# Patient Record
Sex: MALE | Race: WHITE | NOT HISPANIC OR LATINO | Employment: UNEMPLOYED | URBAN - METROPOLITAN AREA
[De-identification: names, ages, dates, MRNs, and addresses within clinical notes are randomized per-mention and may not be internally consistent; named-entity substitution may affect disease eponyms.]

---

## 2017-11-08 LAB
INFLUENZA A ANTIGEN, POC: POSITIVE
INFLUENZA B ANTIGEN, POC: NEGATIVE
RAPID GROUP A STREP (OHS): NEGATIVE

## 2018-05-18 ENCOUNTER — HISTORICAL (OUTPATIENT)
Dept: INTERNAL MEDICINE | Facility: CLINIC | Age: 38
End: 2018-05-18

## 2018-05-18 LAB
ABS NEUT (OLG): 5.41 X10(3)/MCL (ref 2.1–9.2)
ALBUMIN SERPL-MCNC: 3.6 GM/DL (ref 3.4–5)
ALBUMIN/GLOB SERPL: 1 RATIO (ref 1–2)
ALP SERPL-CCNC: 85 UNIT/L (ref 45–117)
ALT SERPL-CCNC: 23 UNIT/L (ref 12–78)
AST SERPL-CCNC: 16 UNIT/L (ref 15–37)
BASOPHILS # BLD AUTO: 0.07 X10(3)/MCL
BASOPHILS NFR BLD AUTO: 1 %
BILIRUB SERPL-MCNC: 0.2 MG/DL (ref 0.2–1)
BILIRUBIN DIRECT+TOT PNL SERPL-MCNC: <0.1 MG/DL
BILIRUBIN DIRECT+TOT PNL SERPL-MCNC: >0.1 MG/DL
BUN SERPL-MCNC: 15 MG/DL (ref 7–18)
CALCIUM SERPL-MCNC: 9.5 MG/DL (ref 8.5–10.1)
CHLORIDE SERPL-SCNC: 104 MMOL/L (ref 98–107)
CHOLEST SERPL-MCNC: 171 MG/DL
CHOLEST/HDLC SERPL: 4.6 {RATIO} (ref 0–5)
CO2 SERPL-SCNC: 32 MMOL/L (ref 21–32)
CREAT SERPL-MCNC: 1.1 MG/DL (ref 0.6–1.3)
EOSINOPHIL # BLD AUTO: 0.31 X10(3)/MCL
EOSINOPHIL NFR BLD AUTO: 3 %
ERYTHROCYTE [DISTWIDTH] IN BLOOD BY AUTOMATED COUNT: 13.1 % (ref 11.5–14.5)
GLOBULIN SER-MCNC: 4.7 GM/ML (ref 2.3–3.5)
GLUCOSE SERPL-MCNC: 95 MG/DL (ref 74–106)
HCT VFR BLD AUTO: 40.8 % (ref 40–51)
HDLC SERPL-MCNC: 37 MG/DL
HGB BLD-MCNC: 12.8 GM/DL (ref 13.5–17.5)
IMM GRANULOCYTES # BLD AUTO: 0.03 10*3/UL
IMM GRANULOCYTES NFR BLD AUTO: 0 %
LDLC SERPL CALC-MCNC: 100 MG/DL (ref 0–130)
LYMPHOCYTES # BLD AUTO: 3.2 X10(3)/MCL
LYMPHOCYTES NFR BLD AUTO: 33 % (ref 13–40)
MCH RBC QN AUTO: 27.2 PG (ref 26–34)
MCHC RBC AUTO-ENTMCNC: 31.4 GM/DL (ref 31–37)
MCV RBC AUTO: 86.6 FL (ref 80–100)
MONOCYTES # BLD AUTO: 0.79 X10(3)/MCL
MONOCYTES NFR BLD AUTO: 8 % (ref 4–12)
NEUTROPHILS # BLD AUTO: 5.41 X10(3)/MCL
NEUTROPHILS NFR BLD AUTO: 55 X10(3)/MCL
PLATELET # BLD AUTO: 321 X10(3)/MCL (ref 130–400)
PMV BLD AUTO: 10.7 FL (ref 7.4–10.4)
POTASSIUM SERPL-SCNC: 4.2 MMOL/L (ref 3.5–5.1)
PROT SERPL-MCNC: 8.3 GM/DL (ref 6.4–8.2)
RBC # BLD AUTO: 4.71 X10(6)/MCL (ref 4.5–5.9)
SODIUM SERPL-SCNC: 138 MMOL/L (ref 136–145)
TRIGL SERPL-MCNC: 172 MG/DL
VLDLC SERPL CALC-MCNC: 34 MG/DL
WBC # SPEC AUTO: 9.8 X10(3)/MCL (ref 4.5–11)

## 2018-07-18 ENCOUNTER — HISTORICAL (OUTPATIENT)
Dept: ENDOSCOPY | Facility: HOSPITAL | Age: 38
End: 2018-07-18

## 2018-10-18 ENCOUNTER — HOSPITAL ENCOUNTER (OUTPATIENT)
Dept: MEDSURG UNIT | Facility: HOSPITAL | Age: 38
End: 2018-10-20
Attending: INTERNAL MEDICINE | Admitting: INTERNAL MEDICINE

## 2018-10-18 LAB
ABS NEUT (OLG): 10.16 X10(3)/MCL (ref 2.1–9.2)
ALBUMIN SERPL-MCNC: 4.1 GM/DL (ref 3.4–5)
ALBUMIN/GLOB SERPL: 1 RATIO (ref 1–2)
ALP SERPL-CCNC: 82 UNIT/L (ref 45–117)
ALT SERPL-CCNC: 30 UNIT/L (ref 12–78)
AMPHET UR QL SCN: NEGATIVE
APPEARANCE, UA: CLEAR
AST SERPL-CCNC: 17 UNIT/L (ref 15–37)
BACTERIA #/AREA URNS AUTO: ABNORMAL /[HPF]
BARBITURATE SCN PRESENT UR: NEGATIVE
BASOPHILS # BLD AUTO: 0.04 X10(3)/MCL
BASOPHILS NFR BLD AUTO: 0 %
BENZODIAZ UR QL SCN: NEGATIVE
BILIRUB SERPL-MCNC: 0.4 MG/DL (ref 0.2–1)
BILIRUB UR QL STRIP: NEGATIVE
BILIRUBIN DIRECT+TOT PNL SERPL-MCNC: 0.1 MG/DL
BILIRUBIN DIRECT+TOT PNL SERPL-MCNC: 0.3 MG/DL
BUN SERPL-MCNC: 49 MG/DL (ref 7–18)
CALCIUM SERPL-MCNC: 9.1 MG/DL (ref 8.5–10.1)
CANNABINOIDS UR QL SCN: POSITIVE
CHLORIDE SERPL-SCNC: 86 MMOL/L (ref 98–107)
CO2 SERPL-SCNC: 36 MMOL/L (ref 21–32)
COCAINE UR QL SCN: NEGATIVE
COLOR UR: ABNORMAL
CREAT SERPL-MCNC: 3.3 MG/DL (ref 0.6–1.3)
EOSINOPHIL # BLD AUTO: 0.06 X10(3)/MCL
EOSINOPHIL NFR BLD AUTO: 0 %
ERYTHROCYTE [DISTWIDTH] IN BLOOD BY AUTOMATED COUNT: 13.6 % (ref 11.5–14.5)
GLOBULIN SER-MCNC: 4.6 GM/ML (ref 2.3–3.5)
GLUCOSE (UA): NORMAL
GLUCOSE SERPL-MCNC: 113 MG/DL (ref 74–106)
HCT VFR BLD AUTO: 43.3 % (ref 40–51)
HGB BLD-MCNC: 14.4 GM/DL (ref 13.5–17.5)
HGB UR QL STRIP: NEGATIVE
HYALINE CASTS #/AREA URNS LPF: ABNORMAL /[LPF]
IMM GRANULOCYTES # BLD AUTO: 0.05 10*3/UL
IMM GRANULOCYTES NFR BLD AUTO: 0 %
KETONES UR QL STRIP: NEGATIVE
LEUKOCYTE ESTERASE UR QL STRIP: NEGATIVE
LYMPHOCYTES # BLD AUTO: 3.66 X10(3)/MCL
LYMPHOCYTES NFR BLD AUTO: 24 % (ref 13–40)
MAGNESIUM SERPL-MCNC: 2.4 MG/DL (ref 1.8–2.4)
MAGNESIUM SERPL-MCNC: 3 MG/DL (ref 1.8–2.4)
MCH RBC QN AUTO: 27.4 PG (ref 26–34)
MCHC RBC AUTO-ENTMCNC: 33.3 GM/DL (ref 31–37)
MCV RBC AUTO: 82.3 FL (ref 80–100)
MONOCYTES # BLD AUTO: 1.5 X10(3)/MCL
MONOCYTES NFR BLD AUTO: 10 % (ref 4–12)
MUCOUS THREADS URNS QL MICRO: ABNORMAL
NEUTROPHILS # BLD AUTO: 10.16 X10(3)/MCL
NEUTROPHILS NFR BLD AUTO: 66 X10(3)/MCL
NITRITE UR QL STRIP: NEGATIVE
OPIATES UR QL SCN: NEGATIVE
PCP UR QL: NEGATIVE
PH UR STRIP.AUTO: 6 [PH] (ref 5–8)
PH UR STRIP: 6 [PH] (ref 4.5–8)
PLATELET # BLD AUTO: 453 X10(3)/MCL (ref 130–400)
PMV BLD AUTO: 10.8 FL (ref 7.4–10.4)
POTASSIUM SERPL-SCNC: 3 MMOL/L (ref 3.5–5.1)
PROT SERPL-MCNC: 8.7 GM/DL (ref 6.4–8.2)
PROT UR QL STRIP: 10 MG/DL
RBC # BLD AUTO: 5.26 X10(6)/MCL (ref 4.5–5.9)
RBC #/AREA URNS AUTO: ABNORMAL /[HPF]
SODIUM SERPL-SCNC: 130 MMOL/L (ref 136–145)
SP GR UR STRIP: 1.01 (ref 1–1.03)
SQUAMOUS #/AREA URNS LPF: ABNORMAL /[LPF]
TEMPERATURE, URINE (OHS): 25 DEGC (ref 20–25)
UROBILINOGEN UR STRIP-ACNC: NORMAL
WBC # SPEC AUTO: 15.5 X10(3)/MCL (ref 4.5–11)
WBC #/AREA URNS AUTO: ABNORMAL /HPF

## 2018-10-19 LAB
ABS NEUT (OLG): 7.87 X10(3)/MCL (ref 2.1–9.2)
ALBUMIN SERPL-MCNC: 3.1 GM/DL (ref 3.4–5)
ALBUMIN/GLOB SERPL: 1 RATIO (ref 1–2)
ALP SERPL-CCNC: 62 UNIT/L (ref 45–117)
ALT SERPL-CCNC: 23 UNIT/L (ref 12–78)
AST SERPL-CCNC: 13 UNIT/L (ref 15–37)
BASOPHILS # BLD AUTO: 0.02 X10(3)/MCL
BASOPHILS NFR BLD AUTO: 0 %
BILIRUB SERPL-MCNC: 0.4 MG/DL (ref 0.2–1)
BILIRUBIN DIRECT+TOT PNL SERPL-MCNC: 0.1 MG/DL
BILIRUBIN DIRECT+TOT PNL SERPL-MCNC: 0.3 MG/DL
BUN SERPL-MCNC: 36 MG/DL (ref 7–18)
CALCIUM SERPL-MCNC: 8.4 MG/DL (ref 8.5–10.1)
CHLORIDE SERPL-SCNC: 97 MMOL/L (ref 98–107)
CO2 SERPL-SCNC: 34 MMOL/L (ref 21–32)
COLOR STL: NORMAL
CONSISTENCY STL: NORMAL
CREAT SERPL-MCNC: 1.9 MG/DL (ref 0.6–1.3)
EOSINOPHIL # BLD AUTO: 0.05 X10(3)/MCL
EOSINOPHIL NFR BLD AUTO: 0 %
ERYTHROCYTE [DISTWIDTH] IN BLOOD BY AUTOMATED COUNT: 13.8 % (ref 11.5–14.5)
GLOBULIN SER-MCNC: 3.6 GM/ML (ref 2.3–3.5)
GLUCOSE SERPL-MCNC: 86 MG/DL (ref 74–106)
HCT VFR BLD AUTO: 36.7 % (ref 40–51)
HEMOCCULT SP1 STL QL: NEGATIVE
HGB BLD-MCNC: 11.9 GM/DL (ref 13.5–17.5)
IMM GRANULOCYTES # BLD AUTO: 0.04 10*3/UL
IMM GRANULOCYTES NFR BLD AUTO: 0 %
LYMPHOCYTES # BLD AUTO: 4.29 X10(3)/MCL
LYMPHOCYTES NFR BLD AUTO: 32 % (ref 13–40)
MCH RBC QN AUTO: 27.2 PG (ref 26–34)
MCHC RBC AUTO-ENTMCNC: 32.4 GM/DL (ref 31–37)
MCV RBC AUTO: 84 FL (ref 80–100)
MONOCYTES # BLD AUTO: 1.03 X10(3)/MCL
MONOCYTES NFR BLD AUTO: 8 % (ref 4–12)
NEUTROPHILS # BLD AUTO: 7.87 X10(3)/MCL
NEUTROPHILS NFR BLD AUTO: 59 X10(3)/MCL
PLATELET # BLD AUTO: 264 X10(3)/MCL (ref 130–400)
PMV BLD AUTO: 11.1 FL (ref 7.4–10.4)
POTASSIUM SERPL-SCNC: 3.8 MMOL/L (ref 3.5–5.1)
PROT SERPL-MCNC: 6.7 GM/DL (ref 6.4–8.2)
RBC # BLD AUTO: 4.37 X10(6)/MCL (ref 4.5–5.9)
SODIUM SERPL-SCNC: 139 MMOL/L (ref 136–145)
WBC # SPEC AUTO: 13.3 X10(3)/MCL (ref 4.5–11)

## 2018-10-20 LAB
ABS NEUT (OLG): 5.23 X10(3)/MCL (ref 2.1–9.2)
ALBUMIN SERPL-MCNC: 2.9 GM/DL (ref 3.4–5)
ALBUMIN/GLOB SERPL: 1 RATIO (ref 1–2)
ALP SERPL-CCNC: 56 UNIT/L (ref 45–117)
ALT SERPL-CCNC: 23 UNIT/L (ref 12–78)
AST SERPL-CCNC: 13 UNIT/L (ref 15–37)
BASOPHILS # BLD AUTO: 0.04 X10(3)/MCL
BASOPHILS NFR BLD AUTO: 0 %
BILIRUB SERPL-MCNC: 0.3 MG/DL (ref 0.2–1)
BILIRUBIN DIRECT+TOT PNL SERPL-MCNC: <0.1 MG/DL
BILIRUBIN DIRECT+TOT PNL SERPL-MCNC: ABNORMAL MG/DL
BUN SERPL-MCNC: 19 MG/DL (ref 7–18)
CALCIUM SERPL-MCNC: 8 MG/DL (ref 8.5–10.1)
CHLORIDE SERPL-SCNC: 105 MMOL/L (ref 98–107)
CO2 SERPL-SCNC: 32 MMOL/L (ref 21–32)
CREAT SERPL-MCNC: 1.1 MG/DL (ref 0.6–1.3)
EOSINOPHIL # BLD AUTO: 0.09 X10(3)/MCL
EOSINOPHIL NFR BLD AUTO: 1 %
ERYTHROCYTE [DISTWIDTH] IN BLOOD BY AUTOMATED COUNT: 13.6 % (ref 11.5–14.5)
GLOBULIN SER-MCNC: 3.4 GM/ML (ref 2.3–3.5)
GLUCOSE SERPL-MCNC: 80 MG/DL (ref 74–106)
HCT VFR BLD AUTO: 34.3 % (ref 40–51)
HGB BLD-MCNC: 11.1 GM/DL (ref 13.5–17.5)
IMM GRANULOCYTES # BLD AUTO: 0.03 10*3/UL
IMM GRANULOCYTES NFR BLD AUTO: 0 %
LYMPHOCYTES # BLD AUTO: 3.36 X10(3)/MCL
LYMPHOCYTES NFR BLD AUTO: 36 % (ref 13–40)
MCH RBC QN AUTO: 27.5 PG (ref 26–34)
MCHC RBC AUTO-ENTMCNC: 32.4 GM/DL (ref 31–37)
MCV RBC AUTO: 84.9 FL (ref 80–100)
MONOCYTES # BLD AUTO: 0.65 X10(3)/MCL
MONOCYTES NFR BLD AUTO: 7 % (ref 4–12)
NEUTROPHILS # BLD AUTO: 5.23 X10(3)/MCL
NEUTROPHILS NFR BLD AUTO: 56 X10(3)/MCL
PLATELET # BLD AUTO: 251 X10(3)/MCL (ref 130–400)
PMV BLD AUTO: 10.9 FL (ref 7.4–10.4)
POTASSIUM SERPL-SCNC: 4.1 MMOL/L (ref 3.5–5.1)
PROT SERPL-MCNC: 6.3 GM/DL (ref 6.4–8.2)
RBC # BLD AUTO: 4.04 X10(6)/MCL (ref 4.5–5.9)
SODIUM SERPL-SCNC: 143 MMOL/L (ref 136–145)
WBC # SPEC AUTO: 9.4 X10(3)/MCL (ref 4.5–11)

## 2018-10-21 LAB — FINAL CULTURE: NORMAL

## 2019-05-30 ENCOUNTER — HISTORICAL (OUTPATIENT)
Dept: INTERNAL MEDICINE | Facility: CLINIC | Age: 39
End: 2019-05-30

## 2019-05-30 LAB
ABS NEUT (OLG): 8.34 X10(3)/MCL (ref 2.1–9.2)
BASOPHILS # BLD AUTO: 0.06 X10(3)/MCL
BASOPHILS NFR BLD AUTO: 0 %
BUN SERPL-MCNC: 13 MG/DL (ref 7–18)
CALCIUM SERPL-MCNC: 9.4 MG/DL (ref 8.5–10.1)
CHLORIDE SERPL-SCNC: 100 MMOL/L (ref 98–107)
CO2 SERPL-SCNC: 31 MMOL/L (ref 21–32)
CREAT SERPL-MCNC: 1.3 MG/DL (ref 0.6–1.3)
CREAT/UREA NIT SERPL: 10
EOSINOPHIL # BLD AUTO: 0.09 X10(3)/MCL
EOSINOPHIL NFR BLD AUTO: 1 %
ERYTHROCYTE [DISTWIDTH] IN BLOOD BY AUTOMATED COUNT: 13.2 % (ref 11.5–14.5)
GLUCOSE SERPL-MCNC: 94 MG/DL (ref 74–106)
HCT VFR BLD AUTO: 40.4 % (ref 40–51)
HGB BLD-MCNC: 13.2 GM/DL (ref 13.5–17.5)
IMM GRANULOCYTES # BLD AUTO: 0.06 10*3/UL
IMM GRANULOCYTES NFR BLD AUTO: 0 %
LYMPHOCYTES # BLD AUTO: 2.24 X10(3)/MCL
LYMPHOCYTES NFR BLD AUTO: 19 % (ref 13–40)
MAGNESIUM SERPL-MCNC: 2.1 MG/DL (ref 1.6–2.6)
MCH RBC QN AUTO: 27.7 PG (ref 26–34)
MCHC RBC AUTO-ENTMCNC: 32.7 GM/DL (ref 31–37)
MCV RBC AUTO: 84.7 FL (ref 80–100)
MONOCYTES # BLD AUTO: 1.04 X10(3)/MCL
MONOCYTES NFR BLD AUTO: 9 % (ref 4–12)
NEUTROPHILS # BLD AUTO: 8.34 X10(3)/MCL
NEUTROPHILS NFR BLD AUTO: 70 X10(3)/MCL
PHOSPHATE SERPL-MCNC: 2.9 MG/DL (ref 2.5–4.9)
PLATELET # BLD AUTO: 364 X10(3)/MCL (ref 130–400)
PMV BLD AUTO: 10.2 FL (ref 7.4–10.4)
POTASSIUM SERPL-SCNC: 3.6 MMOL/L (ref 3.5–5.1)
RBC # BLD AUTO: 4.77 X10(6)/MCL (ref 4.5–5.9)
SODIUM SERPL-SCNC: 136 MMOL/L (ref 136–145)
WBC # SPEC AUTO: 11.8 X10(3)/MCL (ref 4.5–11)

## 2019-07-17 ENCOUNTER — HISTORICAL (OUTPATIENT)
Dept: ENDOSCOPY | Facility: HOSPITAL | Age: 39
End: 2019-07-17

## 2020-02-20 ENCOUNTER — HISTORICAL (OUTPATIENT)
Dept: ADMINISTRATIVE | Facility: HOSPITAL | Age: 40
End: 2020-02-20

## 2020-02-20 LAB
ABS NEUT (OLG): 7.41 X10(3)/MCL (ref 2.1–9.2)
ALBUMIN SERPL-MCNC: 3.6 GM/DL (ref 3.4–5)
ALBUMIN/GLOB SERPL: 0.8 RATIO (ref 1.1–2)
ALP SERPL-CCNC: 71 UNIT/L (ref 45–117)
ALT SERPL-CCNC: 33 UNIT/L (ref 12–78)
APPEARANCE, UA: CLEAR
AST SERPL-CCNC: 17 UNIT/L (ref 15–37)
BACTERIA #/AREA URNS AUTO: ABNORMAL /HPF
BASOPHILS # BLD AUTO: 0 X10(3)/MCL (ref 0–0.2)
BASOPHILS NFR BLD AUTO: 0 %
BILIRUB SERPL-MCNC: 0.2 MG/DL (ref 0.2–1)
BILIRUB UR QL STRIP: NEGATIVE
BILIRUBIN DIRECT+TOT PNL SERPL-MCNC: <0.1 MG/DL (ref 0–0.2)
BILIRUBIN DIRECT+TOT PNL SERPL-MCNC: >0.1 MG/DL
BUN SERPL-MCNC: 11 MG/DL (ref 7–18)
CALCIUM SERPL-MCNC: 8.8 MG/DL (ref 8.5–10.1)
CHLORIDE SERPL-SCNC: 105 MMOL/L (ref 98–107)
CHOLEST SERPL-MCNC: 160 MG/DL
CHOLEST/HDLC SERPL: 4.1 {RATIO} (ref 0–5)
CO2 SERPL-SCNC: 27 MMOL/L (ref 21–32)
COLOR UR: ABNORMAL
CREAT SERPL-MCNC: 1 MG/DL (ref 0.6–1.3)
DEPRECATED CALCIDIOL+CALCIFEROL SERPL-MC: 15.5 NG/ML (ref 30–80)
EOSINOPHIL # BLD AUTO: 0 X10(3)/MCL (ref 0–0.9)
EOSINOPHIL NFR BLD AUTO: 0 %
ERYTHROCYTE [DISTWIDTH] IN BLOOD BY AUTOMATED COUNT: 13.9 % (ref 11.5–14.5)
EST. AVERAGE GLUCOSE BLD GHB EST-MCNC: 114 MG/DL
GLOBULIN SER-MCNC: 4.5 GM/ML (ref 2.3–3.5)
GLUCOSE (UA): NEGATIVE
GLUCOSE SERPL-MCNC: 97 MG/DL (ref 74–106)
HAV IGM SERPL QL IA: NONREACTIVE
HBA1C MFR BLD: 5.6 % (ref 4.2–6.3)
HBV CORE IGM SERPL QL IA: NONREACTIVE
HBV SURFACE AG SERPL QL IA: NONREACTIVE
HCO3 UR-SCNC: 22.8 MMOL/L (ref 22–26)
HCT VFR BLD AUTO: 39.2 % (ref 40–51)
HCV AB SERPL QL IA: NONREACTIVE
HDLC SERPL-MCNC: 39 MG/DL (ref 40–59)
HGB BLD-MCNC: 12 GM/DL (ref 13.5–17.5)
HGB UR QL STRIP: NEGATIVE
HIV 1+2 AB+HIV1 P24 AG SERPL QL IA: NONREACTIVE
HYALINE CASTS #/AREA URNS LPF: ABNORMAL /LPF
IMM GRANULOCYTES # BLD AUTO: 0.03 10*3/UL
IMM GRANULOCYTES NFR BLD AUTO: 0 %
KETONES UR QL STRIP: NEGATIVE
LDLC SERPL CALC-MCNC: 101 MG/DL
LEUKOCYTE ESTERASE UR QL STRIP: NEGATIVE
LYMPHOCYTES # BLD AUTO: 1.9 X10(3)/MCL (ref 0.6–4.6)
LYMPHOCYTES NFR BLD AUTO: 18 %
MCH RBC QN AUTO: 26.3 PG (ref 26–34)
MCHC RBC AUTO-ENTMCNC: 30.6 GM/DL (ref 31–37)
MCV RBC AUTO: 86 FL (ref 80–100)
MONOCYTES # BLD AUTO: 0.7 X10(3)/MCL (ref 0.1–1.3)
MONOCYTES NFR BLD AUTO: 7 %
NEUTROPHILS # BLD AUTO: 7.41 X10(3)/MCL (ref 2.1–9.2)
NEUTROPHILS NFR BLD AUTO: 74 %
NITRITE UR QL STRIP: NEGATIVE
O2 HGB ARTERIAL: 96.2 % (ref 94–100)
PCO2 BLDA: 32 MMHG (ref 35–45)
PH SMN: 7.46 [PH] (ref 7.35–7.45)
PH UR STRIP: 5.5 [PH] (ref 4.5–8)
PLATELET # BLD AUTO: 325 X10(3)/MCL (ref 130–400)
PMV BLD AUTO: 10.1 FL (ref 7.4–10.4)
PO2 BLDA: 119 MMHG (ref 80–100)
POC ALLENS TEST: POSITIVE
POC BE: -0.4 (ref -2–2)
POC CO HGB: 1.4 %
POC CO2: 23.8 MMOL/L (ref 22–26)
POC MET HGB: 1.1 % (ref 0.4–1.5)
POC SAMPLESOURCE: ABNORMAL
POC SATURATED O2: 98.7 %
POC SITE: ABNORMAL
POC THB: 12.5 GM/DL (ref 12–16)
POC TREATMENT: ABNORMAL
POTASSIUM SERPL-SCNC: 4.4 MMOL/L (ref 3.5–5.1)
PROT SERPL-MCNC: 8.1 GM/DL (ref 6.4–8.2)
PROT UR QL STRIP: 10 MG/DL
RBC # BLD AUTO: 4.56 X10(6)/MCL (ref 4.5–5.9)
RBC #/AREA URNS AUTO: ABNORMAL /HPF
SODIUM SERPL-SCNC: 138 MMOL/L (ref 136–145)
SP GR UR STRIP: 1.01 (ref 1–1.03)
SQUAMOUS #/AREA URNS LPF: ABNORMAL /LPF
TRIGL SERPL-MCNC: 99 MG/DL
TSH SERPL-ACNC: 0.95 MIU/L (ref 0.36–3.74)
UROBILINOGEN UR STRIP-ACNC: NORMAL
VLDLC SERPL CALC-MCNC: 20 MG/DL
WBC # SPEC AUTO: 10 X10(3)/MCL (ref 4.5–11)
WBC #/AREA URNS AUTO: ABNORMAL /HPF

## 2020-04-08 ENCOUNTER — HISTORICAL (OUTPATIENT)
Dept: ADMINISTRATIVE | Facility: HOSPITAL | Age: 40
End: 2020-04-08

## 2020-06-23 ENCOUNTER — HOSPITAL ENCOUNTER (OUTPATIENT)
Dept: MEDSURG UNIT | Facility: HOSPITAL | Age: 40
End: 2020-06-25
Attending: INTERNAL MEDICINE | Admitting: INTERNAL MEDICINE

## 2020-06-23 LAB
ABS NEUT (OLG): 12.81 X10(3)/MCL (ref 2.1–9.2)
AMPHET UR QL SCN: NEGATIVE
BARBITURATE SCN PRESENT UR: NEGATIVE
BASOPHILS # BLD AUTO: 0 X10(3)/MCL (ref 0–0.2)
BASOPHILS NFR BLD AUTO: 0 %
BENZODIAZ UR QL SCN: NEGATIVE
CANNABINOIDS UR QL SCN: POSITIVE
COCAINE UR QL SCN: NEGATIVE
ERYTHROCYTE [DISTWIDTH] IN BLOOD BY AUTOMATED COUNT: 15.9 % (ref 11.5–14.5)
HCT VFR BLD AUTO: 41 % (ref 40–51)
HGB BLD-MCNC: 12.4 GM/DL (ref 13.5–17.5)
IMM GRANULOCYTES # BLD AUTO: 0.06 10*3/UL
IMM GRANULOCYTES NFR BLD AUTO: 0 %
LYMPHOCYTES # BLD AUTO: 2.2 X10(3)/MCL (ref 0.6–4.6)
LYMPHOCYTES NFR BLD AUTO: 13 %
MCH RBC QN AUTO: 23.8 PG (ref 26–34)
MCHC RBC AUTO-ENTMCNC: 30.2 GM/DL (ref 31–37)
MCV RBC AUTO: 78.5 FL (ref 80–100)
MONOCYTES # BLD AUTO: 1.3 X10(3)/MCL (ref 0.1–1.3)
MONOCYTES NFR BLD AUTO: 8 %
NEUTROPHILS # BLD AUTO: 12.81 X10(3)/MCL (ref 2.1–9.2)
NEUTROPHILS NFR BLD AUTO: 78 %
OPIATES UR QL SCN: NEGATIVE
PCP UR QL: NEGATIVE
PH UR STRIP.AUTO: 6 [PH] (ref 5–8)
PLATELET # BLD AUTO: 369 X10(3)/MCL (ref 130–400)
PMV BLD AUTO: 10.1 FL (ref 7.4–10.4)
RBC # BLD AUTO: 5.22 X10(6)/MCL (ref 4.5–5.9)
SARS-COV-2 AG RESP QL IA.RAPID: NEGATIVE
TEMPERATURE, URINE (OHS): 20 DEGC (ref 20–25)
WBC # SPEC AUTO: 16.4 X10(3)/MCL (ref 4.5–11)

## 2020-06-24 LAB
ABS NEUT (OLG): 9.35 X10(3)/MCL (ref 2.1–9.2)
ABS NEUT (OLG): NORMAL X10(3)/MCL (ref 2.1–9.2)
ALBUMIN SERPL-MCNC: 3.8 GM/DL (ref 3.4–5)
ALBUMIN SERPL-MCNC: ABNORMAL GM/DL (ref 3.4–5)
ALBUMIN/GLOB SERPL: 0.3 RATIO (ref 1.1–2)
ALBUMIN/GLOB SERPL: 0.8 RATIO (ref 1.1–2)
ALP SERPL-CCNC: 91 UNIT/L (ref 45–117)
ALP SERPL-CCNC: ABNORMAL UNIT/L (ref 45–117)
ALT SERPL-CCNC: 30 UNIT/L (ref 12–78)
ALT SERPL-CCNC: ABNORMAL UNIT/L (ref 12–78)
ANISOCYTOSIS BLD QL SMEAR: NORMAL
AST SERPL-CCNC: 17 UNIT/L (ref 15–37)
AST SERPL-CCNC: ABNORMAL UNIT/L (ref 15–37)
BASOPHILS # BLD AUTO: 0 X10(3)/MCL (ref 0–0.2)
BASOPHILS # BLD AUTO: NORMAL X10(3)/MCL (ref 0–0.2)
BASOPHILS NFR BLD AUTO: 0 %
BASOPHILS NFR BLD AUTO: NORMAL %
BILIRUB SERPL-MCNC: 0.4 MG/DL (ref 0.2–1)
BILIRUB SERPL-MCNC: ABNORMAL MG/DL (ref 0.2–1)
BILIRUBIN DIRECT+TOT PNL SERPL-MCNC: 0.2 MG/DL
BILIRUBIN DIRECT+TOT PNL SERPL-MCNC: 0.2 MG/DL (ref 0–0.2)
BILIRUBIN DIRECT+TOT PNL SERPL-MCNC: 0.4 MG/DL
BILIRUBIN DIRECT+TOT PNL SERPL-MCNC: ABNORMAL MG/DL (ref 0–0.2)
BUN SERPL-MCNC: 16 MG/DL (ref 7–18)
BUN SERPL-MCNC: ABNORMAL MG/DL (ref 7–18)
CALCIUM SERPL-MCNC: 9.5 MG/DL (ref 8.5–10.1)
CALCIUM SERPL-MCNC: ABNORMAL MG/DL (ref 8.5–10.1)
CHLORIDE SERPL-SCNC: 91 MMOL/L (ref 98–107)
CHLORIDE SERPL-SCNC: ABNORMAL MMOL/L (ref 98–107)
CO2 SERPL-SCNC: 36 MMOL/L (ref 21–32)
CO2 SERPL-SCNC: ABNORMAL MMOL/L (ref 21–32)
CREAT SERPL-MCNC: 1.19 MG/DL (ref 0.6–1.3)
CREAT SERPL-MCNC: ABNORMAL MG/DL (ref 0.6–1.3)
EOSINOPHIL # BLD AUTO: 0 X10(3)/MCL (ref 0–0.9)
EOSINOPHIL # BLD AUTO: NORMAL X10(3)/MCL (ref 0–0.9)
EOSINOPHIL NFR BLD AUTO: 0 %
EOSINOPHIL NFR BLD AUTO: NORMAL %
ERYTHROCYTE [DISTWIDTH] IN BLOOD BY AUTOMATED COUNT: 15.9 % (ref 11.5–14.5)
ERYTHROCYTE [DISTWIDTH] IN BLOOD BY AUTOMATED COUNT: NORMAL % (ref 11.5–14.5)
GLOBULIN SER-MCNC: 4.9 GM/ML (ref 2.3–3.5)
GLOBULIN SER-MCNC: 6.1 GM/ML (ref 2.3–3.5)
GLUCOSE SERPL-MCNC: 103 MG/DL (ref 74–106)
GLUCOSE SERPL-MCNC: ABNORMAL MG/DL (ref 74–106)
HCT VFR BLD AUTO: 39.2 % (ref 40–51)
HCT VFR BLD AUTO: NORMAL % (ref 40–51)
HGB BLD-MCNC: 11.9 GM/DL (ref 13.5–17.5)
HGB BLD-MCNC: NORMAL GM/DL (ref 13.5–17.5)
IMM GRANULOCYTES # BLD AUTO: 0 10*3/UL
IMM GRANULOCYTES # BLD AUTO: NORMAL 10*3/UL
IMM GRANULOCYTES NFR BLD AUTO: NORMAL %
LYMPHOCYTES # BLD AUTO: 2.4 X10(3)/MCL (ref 0.6–4.6)
LYMPHOCYTES # BLD AUTO: NORMAL X10(3)/MCL (ref 0.6–4.6)
LYMPHOCYTES NFR BLD AUTO: 19 %
LYMPHOCYTES NFR BLD AUTO: NORMAL %
MCH RBC QN AUTO: 24.2 PG (ref 26–34)
MCH RBC QN AUTO: NORMAL PG (ref 26–34)
MCHC RBC AUTO-ENTMCNC: 30.4 GM/DL (ref 31–37)
MCHC RBC AUTO-ENTMCNC: NORMAL GM/DL (ref 31–37)
MCV RBC AUTO: 79.7 FL (ref 80–100)
MCV RBC AUTO: NORMAL FL (ref 80–100)
MONOCYTES # BLD AUTO: 0.8 X10(3)/MCL (ref 0.1–1.3)
MONOCYTES # BLD AUTO: NORMAL X10(3)/MCL (ref 0.1–1.3)
MONOCYTES NFR BLD AUTO: 6 %
MONOCYTES NFR BLD AUTO: NORMAL %
NEUTROPHILS # BLD AUTO: 9.35 X10(3)/MCL (ref 2.1–9.2)
NEUTROPHILS # BLD AUTO: NORMAL X10(3)/MCL (ref 2.1–9.2)
NEUTROPHILS NFR BLD AUTO: 74 %
NEUTROPHILS NFR BLD AUTO: NORMAL %
PLATELET # BLD AUTO: 335 X10(3)/MCL (ref 130–400)
PLATELET # BLD AUTO: NORMAL X10(3)/MCL (ref 130–400)
PLATELET # BLD EST: NORMAL 10*3/UL
PMV BLD AUTO: 9.7 FL (ref 7.4–10.4)
PMV BLD AUTO: NORMAL FL (ref 7.4–10.4)
POIKILOCYTOSIS BLD QL SMEAR: NORMAL
POLYCHROMASIA BLD QL SMEAR: NORMAL
POTASSIUM SERPL-SCNC: 3.3 MMOL/L (ref 3.5–5.1)
POTASSIUM SERPL-SCNC: ABNORMAL MMOL/L (ref 3.5–5.1)
PROT SERPL-MCNC: 8.7 GM/DL (ref 6.4–8.2)
PROT SERPL-MCNC: ABNORMAL GM/DL (ref 6.4–8.2)
RBC # BLD AUTO: 4.92 X10(6)/MCL (ref 4.5–5.9)
RBC # BLD AUTO: NORMAL X10(6)/MCL (ref 4.5–5.9)
RBC MORPH BLD: NORMAL
SCHISTOCYTES BLD QL AUTO: NORMAL
SODIUM SERPL-SCNC: 132 MMOL/L (ref 136–145)
SODIUM SERPL-SCNC: ABNORMAL MMOL/L (ref 136–145)
WBC # SPEC AUTO: 12.6 X10(3)/MCL (ref 4.5–11)
WBC # SPEC AUTO: NORMAL X10(3)/MCL (ref 4.5–11)

## 2020-06-25 LAB
ABS NEUT (OLG): 6.45 X10(3)/MCL (ref 2.1–9.2)
ALBUMIN SERPL-MCNC: 3.6 GM/DL (ref 3.4–5)
ALBUMIN/GLOB SERPL: 0.8 RATIO (ref 1.1–2)
ALP SERPL-CCNC: 82 UNIT/L (ref 45–117)
ALT SERPL-CCNC: 28 UNIT/L (ref 12–78)
AST SERPL-CCNC: 21 UNIT/L (ref 15–37)
BASOPHILS # BLD AUTO: 0.1 X10(3)/MCL (ref 0–0.2)
BASOPHILS NFR BLD AUTO: 0 %
BILIRUB SERPL-MCNC: 0.4 MG/DL (ref 0.2–1)
BILIRUBIN DIRECT+TOT PNL SERPL-MCNC: 0.1 MG/DL (ref 0–0.2)
BILIRUBIN DIRECT+TOT PNL SERPL-MCNC: 0.3 MG/DL
BUN SERPL-MCNC: 13 MG/DL (ref 7–18)
CALCIUM SERPL-MCNC: 9.1 MG/DL (ref 8.5–10.1)
CHLORIDE SERPL-SCNC: 97 MMOL/L (ref 98–107)
CO2 SERPL-SCNC: 29 MMOL/L (ref 21–32)
COLOR STL: NORMAL
CONSISTENCY STL: NORMAL
CREAT SERPL-MCNC: 1.1 MG/DL (ref 0.6–1.3)
EOSINOPHIL # BLD AUTO: 0.1 X10(3)/MCL (ref 0–0.9)
EOSINOPHIL NFR BLD AUTO: 0 %
ERYTHROCYTE [DISTWIDTH] IN BLOOD BY AUTOMATED COUNT: 15.9 % (ref 11.5–14.5)
FERRITIN SERPL-MCNC: 25.3 NG/ML (ref 26–388)
FOLATE SERPL-MCNC: 15.4 NG/ML (ref 3.1–17.5)
GLOBULIN SER-MCNC: 4.7 GM/ML (ref 2.3–3.5)
GLUCOSE SERPL-MCNC: 88 MG/DL (ref 74–106)
HCT VFR BLD AUTO: 40.2 % (ref 40–51)
HGB BLD-MCNC: 12.1 GM/DL (ref 13.5–17.5)
IMM GRANULOCYTES # BLD AUTO: 0.05 10*3/UL
IMM GRANULOCYTES NFR BLD AUTO: 0 %
IRON SATN MFR SERPL: 8.1 % (ref 15–50)
IRON SERPL-MCNC: 31 MCG/DL (ref 65–175)
IRON SERPL-MCNC: 32 MCG/DL (ref 65–175)
LYMPHOCYTES # BLD AUTO: 3.6 X10(3)/MCL (ref 0.6–4.6)
LYMPHOCYTES NFR BLD AUTO: 33 %
MCH RBC QN AUTO: 24.4 PG (ref 26–34)
MCHC RBC AUTO-ENTMCNC: 30.1 GM/DL (ref 31–37)
MCV RBC AUTO: 81 FL (ref 80–100)
MONOCYTES # BLD AUTO: 0.8 X10(3)/MCL (ref 0.1–1.3)
MONOCYTES NFR BLD AUTO: 7 %
NEUTROPHILS # BLD AUTO: 6.45 X10(3)/MCL (ref 2.1–9.2)
NEUTROPHILS NFR BLD AUTO: 58 %
PLATELET # BLD AUTO: 344 X10(3)/MCL (ref 130–400)
PMV BLD AUTO: 10.5 FL (ref 7.4–10.4)
POTASSIUM SERPL-SCNC: 3.4 MMOL/L (ref 3.5–5.1)
PROT SERPL-MCNC: 8.3 GM/DL (ref 6.4–8.2)
RBC # BLD AUTO: 4.96 X10(6)/MCL (ref 4.5–5.9)
RET# (OHS): 0.09 X10(6)/MCL (ref 0.02–0.09)
RETICULOCYTE COUNT AUTOMATED (OLG): 1.9 % (ref 0.5–1.5)
SODIUM SERPL-SCNC: 135 MMOL/L (ref 136–145)
TIBC SERPL-MCNC: 394 MCG/DL (ref 250–450)
TRANSFERRIN SERPL-MCNC: 291 MG/DL (ref 200–360)
VIT B12 SERPL-MCNC: 482 PG/ML (ref 193–986)
WBC # SPEC AUTO: 11.1 X10(3)/MCL (ref 4.5–11)

## 2020-08-11 ENCOUNTER — HOSPITAL ENCOUNTER (OUTPATIENT)
Dept: MEDSURG UNIT | Facility: HOSPITAL | Age: 40
End: 2020-08-12
Attending: INTERNAL MEDICINE | Admitting: INTERNAL MEDICINE

## 2020-08-11 LAB
ABS NEUT (OLG): 7.64 X10(3)/MCL (ref 2.1–9.2)
ALBUMIN SERPL-MCNC: 4 GM/DL (ref 3.4–5)
ALBUMIN/GLOB SERPL: 0.9 RATIO (ref 1.1–2)
ALP SERPL-CCNC: 90 UNIT/L (ref 45–117)
ALT SERPL-CCNC: 44 UNIT/L (ref 12–78)
AMPHET UR QL SCN: NEGATIVE
APPEARANCE, UA: CLEAR
AST SERPL-CCNC: 29 UNIT/L (ref 15–37)
BACTERIA #/AREA URNS AUTO: ABNORMAL /HPF
BARBITURATE SCN PRESENT UR: NEGATIVE
BASOPHILS # BLD AUTO: 0.1 X10(3)/MCL (ref 0–0.2)
BASOPHILS NFR BLD AUTO: 0 %
BENZODIAZ UR QL SCN: NEGATIVE
BILIRUB SERPL-MCNC: 0.4 MG/DL (ref 0.2–1)
BILIRUB UR QL STRIP: NEGATIVE
BILIRUBIN DIRECT+TOT PNL SERPL-MCNC: 0.1 MG/DL (ref 0–0.2)
BILIRUBIN DIRECT+TOT PNL SERPL-MCNC: 0.3 MG/DL
BUN SERPL-MCNC: 18 MG/DL (ref 7–18)
CALCIUM SERPL-MCNC: 9.6 MG/DL (ref 8.5–10.1)
CANNABINOIDS UR QL SCN: POSITIVE
CHLORIDE SERPL-SCNC: 86 MMOL/L (ref 98–107)
CK SERPL-CCNC: 270 UNIT/L (ref 39–308)
CO2 SERPL-SCNC: 34 MMOL/L (ref 21–32)
COCAINE UR QL SCN: NEGATIVE
COLOR UR: YELLOW
CREAT SERPL-MCNC: 2.4 MG/DL (ref 0.6–1.3)
EOSINOPHIL # BLD AUTO: 0.1 X10(3)/MCL (ref 0–0.9)
EOSINOPHIL NFR BLD AUTO: 1 %
ERYTHROCYTE [DISTWIDTH] IN BLOOD BY AUTOMATED COUNT: 15 % (ref 11.5–14.5)
FERRITIN SERPL-MCNC: 26.4 NG/ML (ref 26–388)
GLOBULIN SER-MCNC: 4.6 GM/ML (ref 2.3–3.5)
GLUCOSE (UA): NEGATIVE
GLUCOSE SERPL-MCNC: 158 MG/DL (ref 74–106)
HCT VFR BLD AUTO: 41.2 % (ref 40–51)
HGB BLD-MCNC: 12.7 GM/DL (ref 13.5–17.5)
HGB UR QL STRIP: NEGATIVE
HYALINE CASTS #/AREA URNS LPF: ABNORMAL /LPF
IMM GRANULOCYTES # BLD AUTO: 0.04 10*3/UL
IMM GRANULOCYTES NFR BLD AUTO: 0 %
IRON SATN MFR SERPL: 7.9 % (ref 15–50)
IRON SERPL-MCNC: 34 MCG/DL (ref 65–175)
KETONES UR QL STRIP: NEGATIVE
LEUKOCYTE ESTERASE UR QL STRIP: NEGATIVE
LYMPHOCYTES # BLD AUTO: 3.6 X10(3)/MCL (ref 0.6–4.6)
LYMPHOCYTES NFR BLD AUTO: 29 %
MAGNESIUM SERPL-MCNC: 2.5 MG/DL (ref 1.6–2.6)
MCH RBC QN AUTO: 24.1 PG (ref 26–34)
MCHC RBC AUTO-ENTMCNC: 30.8 GM/DL (ref 31–37)
MCV RBC AUTO: 78 FL (ref 80–100)
MONOCYTES # BLD AUTO: 0.8 X10(3)/MCL (ref 0.1–1.3)
MONOCYTES NFR BLD AUTO: 7 %
NEUTROPHILS # BLD AUTO: 7.64 X10(3)/MCL (ref 2.1–9.2)
NEUTROPHILS NFR BLD AUTO: 63 %
NITRITE UR QL STRIP: NEGATIVE
OPIATES UR QL SCN: NEGATIVE
PCP UR QL: NEGATIVE
PH UR STRIP.AUTO: 7.5 [PH] (ref 5–8)
PH UR STRIP: 7.5 [PH] (ref 4.5–8)
PHOSPHATE SERPL-MCNC: 2.1 MG/DL (ref 2.5–4.9)
PLATELET # BLD AUTO: 425 X10(3)/MCL (ref 130–400)
PMV BLD AUTO: 11.2 FL (ref 7.4–10.4)
POTASSIUM SERPL-SCNC: 2.5 MMOL/L (ref 3.5–5.1)
PROT SERPL-MCNC: 8.6 GM/DL (ref 6.4–8.2)
PROT UR QL STRIP: 30 MG/DL
RBC # BLD AUTO: 5.28 X10(6)/MCL (ref 4.5–5.9)
RBC #/AREA URNS AUTO: ABNORMAL /HPF
SARS-COV-2 AG RESP QL IA.RAPID: NEGATIVE
SODIUM SERPL-SCNC: 130 MMOL/L (ref 136–145)
SP GR UR STRIP: 1.01 (ref 1–1.03)
SQUAMOUS #/AREA URNS LPF: ABNORMAL /LPF
TEMPERATURE, URINE (OHS): 23 DEGC (ref 20–25)
TIBC SERPL-MCNC: 428 MCG/DL (ref 250–450)
TRANSFERRIN SERPL-MCNC: 307 MG/DL (ref 200–360)
TROPONIN I SERPL-MCNC: <0.015 NG/ML (ref 0–0.05)
UROBILINOGEN UR STRIP-ACNC: NORMAL
WBC # SPEC AUTO: 12.2 X10(3)/MCL (ref 4.5–11)
WBC #/AREA URNS AUTO: ABNORMAL /HPF

## 2020-08-12 LAB
ABS NEUT (OLG): 3.62 X10(3)/MCL (ref 2.1–9.2)
BASOPHILS # BLD AUTO: 0 X10(3)/MCL (ref 0–0.2)
BASOPHILS NFR BLD AUTO: 0 %
BUN SERPL-MCNC: 14 MG/DL (ref 7–18)
BUN SERPL-MCNC: 15 MG/DL (ref 7–18)
CALCIUM SERPL-MCNC: 8.3 MG/DL (ref 8.5–10.1)
CALCIUM SERPL-MCNC: 8.6 MG/DL (ref 8.5–10.1)
CHLORIDE SERPL-SCNC: 94 MMOL/L (ref 98–107)
CHLORIDE SERPL-SCNC: 95 MMOL/L (ref 98–107)
CO2 SERPL-SCNC: 36 MMOL/L (ref 21–32)
CO2 SERPL-SCNC: 37 MMOL/L (ref 21–32)
CREAT SERPL-MCNC: 1.4 MG/DL (ref 0.6–1.3)
CREAT SERPL-MCNC: 1.5 MG/DL (ref 0.6–1.3)
CREAT/UREA NIT SERPL: 10 MG/DL (ref 12–14)
CREAT/UREA NIT SERPL: 10 MG/DL (ref 12–14)
EOSINOPHIL # BLD AUTO: 0.2 X10(3)/MCL (ref 0–0.9)
EOSINOPHIL NFR BLD AUTO: 2 %
ERYTHROCYTE [DISTWIDTH] IN BLOOD BY AUTOMATED COUNT: 15.2 % (ref 11.5–14.5)
GLUCOSE SERPL-MCNC: 92 MG/DL (ref 74–106)
GLUCOSE SERPL-MCNC: 99 MG/DL (ref 74–106)
HCT VFR BLD AUTO: 35.9 % (ref 40–51)
HGB BLD-MCNC: 10.9 GM/DL (ref 13.5–17.5)
IMM GRANULOCYTES # BLD AUTO: 0.02 10*3/UL
IMM GRANULOCYTES NFR BLD AUTO: 0 %
LYMPHOCYTES # BLD AUTO: 3.7 X10(3)/MCL (ref 0.6–4.6)
LYMPHOCYTES NFR BLD AUTO: 45 %
MAGNESIUM SERPL-MCNC: 2.6 MG/DL (ref 1.6–2.6)
MCH RBC QN AUTO: 24.3 PG (ref 26–34)
MCHC RBC AUTO-ENTMCNC: 30.4 GM/DL (ref 31–37)
MCV RBC AUTO: 80.1 FL (ref 80–100)
MONOCYTES # BLD AUTO: 0.7 X10(3)/MCL (ref 0.1–1.3)
MONOCYTES NFR BLD AUTO: 9 %
NEUTROPHILS # BLD AUTO: 3.62 X10(3)/MCL (ref 2.1–9.2)
NEUTROPHILS NFR BLD AUTO: 44 %
PHOSPHATE SERPL-MCNC: 2.6 MG/DL (ref 2.5–4.9)
PLATELET # BLD AUTO: 258 X10(3)/MCL (ref 130–400)
PMV BLD AUTO: 10.8 FL (ref 7.4–10.4)
POTASSIUM SERPL-SCNC: 3 MMOL/L (ref 3.5–5.1)
POTASSIUM SERPL-SCNC: 3.4 MMOL/L (ref 3.5–5.1)
RBC # BLD AUTO: 4.48 X10(6)/MCL (ref 4.5–5.9)
SODIUM SERPL-SCNC: 135 MMOL/L (ref 136–145)
SODIUM SERPL-SCNC: 136 MMOL/L (ref 136–145)
WBC # SPEC AUTO: 8.3 X10(3)/MCL (ref 4.5–11)

## 2020-09-25 ENCOUNTER — HISTORICAL (OUTPATIENT)
Dept: INTERNAL MEDICINE | Facility: CLINIC | Age: 40
End: 2020-09-25

## 2020-09-25 LAB
ABS NEUT (OLG): 6.04 X10(3)/MCL (ref 2.1–9.2)
BASOPHILS # BLD AUTO: 0.1 X10(3)/MCL (ref 0–0.2)
BASOPHILS NFR BLD AUTO: 1 %
BUN SERPL-MCNC: 16 MG/DL (ref 7–18)
CALCIUM SERPL-MCNC: 9.9 MG/DL (ref 8.5–10.1)
CHLORIDE SERPL-SCNC: 102 MMOL/L (ref 98–107)
CO2 SERPL-SCNC: 29 MMOL/L (ref 21–32)
CREAT SERPL-MCNC: 1.1 MG/DL (ref 0.6–1.3)
CREAT/UREA NIT SERPL: 15 MG/DL (ref 12–14)
EOSINOPHIL # BLD AUTO: 0.8 X10(3)/MCL (ref 0–0.9)
EOSINOPHIL NFR BLD AUTO: 8 %
ERYTHROCYTE [DISTWIDTH] IN BLOOD BY AUTOMATED COUNT: 16.4 % (ref 11.5–14.5)
FERRITIN SERPL-MCNC: 11.3 NG/ML (ref 26–388)
GLUCOSE SERPL-MCNC: 102 MG/DL (ref 74–106)
HCT VFR BLD AUTO: 40.7 % (ref 40–51)
HGB BLD-MCNC: 12.5 GM/DL (ref 13.5–17.5)
IMM GRANULOCYTES # BLD AUTO: 0.03 10*3/UL
IMM GRANULOCYTES NFR BLD AUTO: 0 %
IRON SATN MFR SERPL: 8.1 % (ref 15–50)
IRON SERPL-MCNC: 38 MCG/DL (ref 65–175)
LYMPHOCYTES # BLD AUTO: 3 X10(3)/MCL (ref 0.6–4.6)
LYMPHOCYTES NFR BLD AUTO: 28 %
MCH RBC QN AUTO: 24.8 PG (ref 26–34)
MCHC RBC AUTO-ENTMCNC: 30.7 GM/DL (ref 31–37)
MCV RBC AUTO: 80.8 FL (ref 80–100)
MONOCYTES # BLD AUTO: 0.7 X10(3)/MCL (ref 0.1–1.3)
MONOCYTES NFR BLD AUTO: 7 %
NEUTROPHILS # BLD AUTO: 6.04 X10(3)/MCL (ref 2.1–9.2)
NEUTROPHILS NFR BLD AUTO: 56 %
PLATELET # BLD AUTO: 368 X10(3)/MCL (ref 130–400)
PMV BLD AUTO: 10.8 FL (ref 7.4–10.4)
POTASSIUM SERPL-SCNC: 4.4 MMOL/L (ref 3.5–5.1)
RBC # BLD AUTO: 5.04 X10(6)/MCL (ref 4.5–5.9)
SODIUM SERPL-SCNC: 137 MMOL/L (ref 136–145)
TIBC SERPL-MCNC: 469 MCG/DL (ref 250–450)
TRANSFERRIN SERPL-MCNC: 328 MG/DL (ref 200–360)
WBC # SPEC AUTO: 10.7 X10(3)/MCL (ref 4.5–11)

## 2020-11-05 ENCOUNTER — HISTORICAL (OUTPATIENT)
Dept: ADMINISTRATIVE | Facility: HOSPITAL | Age: 40
End: 2020-11-05

## 2020-11-05 LAB
ABS NEUT (OLG): 4.63 X10(3)/MCL (ref 2.1–9.2)
ALBUMIN SERPL-MCNC: 3.9 GM/DL (ref 3.5–5)
ALBUMIN/GLOB SERPL: 0.9 RATIO (ref 1.1–2)
ALP SERPL-CCNC: 85 UNIT/L (ref 40–150)
ALT SERPL-CCNC: 29 UNIT/L (ref 0–55)
AST SERPL-CCNC: 21 UNIT/L (ref 5–34)
BASOPHILS # BLD AUTO: 0.1 X10(3)/MCL (ref 0–0.2)
BASOPHILS NFR BLD AUTO: 1 %
BILIRUB SERPL-MCNC: 0.2 MG/DL
BILIRUBIN DIRECT+TOT PNL SERPL-MCNC: 0.1 MG/DL (ref 0–0.5)
BILIRUBIN DIRECT+TOT PNL SERPL-MCNC: 0.1 MG/DL (ref 0–0.8)
BUN SERPL-MCNC: 16 MG/DL (ref 8.9–20.6)
CALCIUM SERPL-MCNC: 10.5 MG/DL (ref 8.4–10.2)
CHLORIDE SERPL-SCNC: 96 MMOL/L (ref 98–107)
CO2 SERPL-SCNC: 29 MMOL/L (ref 22–29)
CREAT SERPL-MCNC: 0.89 MG/DL (ref 0.73–1.18)
EOSINOPHIL # BLD AUTO: 0.6 X10(3)/MCL (ref 0–0.9)
EOSINOPHIL NFR BLD AUTO: 7 %
ERYTHROCYTE [DISTWIDTH] IN BLOOD BY AUTOMATED COUNT: 15.6 % (ref 11.5–14.5)
FERRITIN SERPL-MCNC: 29.99 NG/ML (ref 21.81–274.66)
GLOBULIN SER-MCNC: 4.5 GM/DL (ref 2.4–3.5)
GLUCOSE SERPL-MCNC: 87 MG/DL (ref 74–100)
HCT VFR BLD AUTO: 38.3 % (ref 40–51)
HGB BLD-MCNC: 12.3 GM/DL (ref 13.5–17.5)
IMM GRANULOCYTES # BLD AUTO: 0.03 10*3/UL
IMM GRANULOCYTES NFR BLD AUTO: 0 %
IRON SATN MFR SERPL: 11 % (ref 20–50)
IRON SERPL-MCNC: 41 UG/DL (ref 65–175)
LYMPHOCYTES # BLD AUTO: 2.9 X10(3)/MCL (ref 0.6–4.6)
LYMPHOCYTES NFR BLD AUTO: 32 %
MCH RBC QN AUTO: 26 PG (ref 26–34)
MCHC RBC AUTO-ENTMCNC: 32.1 GM/DL (ref 31–37)
MCV RBC AUTO: 81 FL (ref 80–100)
MONOCYTES # BLD AUTO: 0.7 X10(3)/MCL (ref 0.1–1.3)
MONOCYTES NFR BLD AUTO: 8 %
NEUTROPHILS # BLD AUTO: 4.63 X10(3)/MCL (ref 2.1–9.2)
NEUTROPHILS NFR BLD AUTO: 52 %
PLATELET # BLD AUTO: 361 X10(3)/MCL (ref 130–400)
PMV BLD AUTO: 10.7 FL (ref 7.4–10.4)
POTASSIUM SERPL-SCNC: 3.8 MMOL/L (ref 3.5–5.1)
PROT SERPL-MCNC: 8.4 GM/DL (ref 6.4–8.3)
RBC # BLD AUTO: 4.73 X10(6)/MCL (ref 4.5–5.9)
SODIUM SERPL-SCNC: 132 MMOL/L (ref 136–145)
TIBC SERPL-MCNC: 335 UG/DL (ref 69–240)
TIBC SERPL-MCNC: 376 UG/DL (ref 250–450)
TRANSFERRIN SERPL-MCNC: 343 MG/DL (ref 174–364)
WBC # SPEC AUTO: 9 X10(3)/MCL (ref 4.5–11)

## 2020-12-27 ENCOUNTER — HOSPITAL ENCOUNTER (OUTPATIENT)
Dept: MEDSURG UNIT | Facility: HOSPITAL | Age: 40
End: 2020-12-28
Attending: INTERNAL MEDICINE | Admitting: INTERNAL MEDICINE

## 2020-12-27 LAB
ABS NEUT (OLG): 12.37 X10(3)/MCL (ref 2.1–9.2)
ABS NEUT (OLG): 16.1 X10(3)/MCL (ref 2.1–9.2)
ALBUMIN SERPL-MCNC: 4.9 GM/DL (ref 3.5–5)
ALBUMIN/GLOB SERPL: 1 RATIO (ref 1.1–2)
ALP SERPL-CCNC: 98 UNIT/L (ref 40–150)
ALT SERPL-CCNC: 22 UNIT/L (ref 0–55)
AMPHET UR QL SCN: NEGATIVE
APPEARANCE, UA: ABNORMAL
AST SERPL-CCNC: 27 UNIT/L (ref 5–34)
BACTERIA #/AREA URNS AUTO: ABNORMAL /HPF
BARBITURATE SCN PRESENT UR: NEGATIVE
BASOPHILS # BLD AUTO: 0 X10(3)/MCL (ref 0–0.2)
BASOPHILS # BLD AUTO: 0 X10(3)/MCL (ref 0–0.2)
BASOPHILS NFR BLD AUTO: 0 %
BASOPHILS NFR BLD AUTO: 0 %
BENZODIAZ UR QL SCN: NEGATIVE
BILIRUB SERPL-MCNC: 0.6 MG/DL
BILIRUB UR QL STRIP: NEGATIVE
BILIRUBIN DIRECT+TOT PNL SERPL-MCNC: 0.3 MG/DL (ref 0–0.5)
BILIRUBIN DIRECT+TOT PNL SERPL-MCNC: 0.3 MG/DL (ref 0–0.8)
BUN SERPL-MCNC: 57 MG/DL (ref 8.9–20.6)
CALCIUM SERPL-MCNC: 11 MG/DL (ref 8.4–10.2)
CANNABINOIDS UR QL SCN: POSITIVE
CHLORIDE SERPL-SCNC: 74 MMOL/L (ref 98–107)
CO2 SERPL-SCNC: 28 MMOL/L (ref 22–29)
COCAINE UR QL SCN: NEGATIVE
COLOR UR: YELLOW
CREAT SERPL-MCNC: 4.72 MG/DL (ref 0.73–1.18)
CREAT UR-MCNC: 66 MG/DL (ref 58–161)
EOSINOPHIL # BLD AUTO: 0 X10(3)/MCL (ref 0–0.9)
EOSINOPHIL NFR BLD AUTO: 0 %
ERYTHROCYTE [DISTWIDTH] IN BLOOD BY AUTOMATED COUNT: 13.8 % (ref 11.5–14.5)
ERYTHROCYTE [DISTWIDTH] IN BLOOD BY AUTOMATED COUNT: 14.2 % (ref 11.5–14.5)
GLOBULIN SER-MCNC: 5.1 GM/DL (ref 2.4–3.5)
GLUCOSE (UA): NEGATIVE
GLUCOSE SERPL-MCNC: 184 MG/DL (ref 74–100)
HCT VFR BLD AUTO: 36.6 % (ref 40–51)
HCT VFR BLD AUTO: 43 % (ref 40–51)
HGB BLD-MCNC: 11.8 GM/DL (ref 13.5–17.5)
HGB BLD-MCNC: 14.5 GM/DL (ref 13.5–17.5)
HGB UR QL STRIP: 0.2
HYALINE CASTS #/AREA URNS LPF: >=100 /LPF
IMM GRANULOCYTES # BLD AUTO: 0.07 10*3/UL
IMM GRANULOCYTES # BLD AUTO: 0.07 10*3/UL
IMM GRANULOCYTES NFR BLD AUTO: 0 %
IMM GRANULOCYTES NFR BLD AUTO: 0 %
KETONES UR QL STRIP: ABNORMAL
LEUKOCYTE ESTERASE UR QL STRIP: NEGATIVE
LIPASE SERPL-CCNC: 12 U/L
LYMPHOCYTES # BLD AUTO: 2.2 X10(3)/MCL (ref 0.6–4.6)
LYMPHOCYTES # BLD AUTO: 3.1 X10(3)/MCL (ref 0.6–4.6)
LYMPHOCYTES NFR BLD AUTO: 11 %
LYMPHOCYTES NFR BLD AUTO: 19 %
MAGNESIUM SERPL-MCNC: 1.64 MG/DL (ref 1.6–2.6)
MCH RBC QN AUTO: 26.4 PG (ref 26–34)
MCH RBC QN AUTO: 26.5 PG (ref 26–34)
MCHC RBC AUTO-ENTMCNC: 32.2 GM/DL (ref 31–37)
MCHC RBC AUTO-ENTMCNC: 33.7 GM/DL (ref 31–37)
MCV RBC AUTO: 78.3 FL (ref 80–100)
MCV RBC AUTO: 82.2 FL (ref 80–100)
MONOCYTES # BLD AUTO: 0.9 X10(3)/MCL (ref 0.1–1.3)
MONOCYTES # BLD AUTO: 1.2 X10(3)/MCL (ref 0.1–1.3)
MONOCYTES NFR BLD AUTO: 5 %
MONOCYTES NFR BLD AUTO: 6 %
NEUTROPHILS # BLD AUTO: 12.37 X10(3)/MCL (ref 2.1–9.2)
NEUTROPHILS # BLD AUTO: 16.1 X10(3)/MCL (ref 2.1–9.2)
NEUTROPHILS NFR BLD AUTO: 75 %
NEUTROPHILS NFR BLD AUTO: 82 %
NITRITE UR QL STRIP: NEGATIVE
OPIATES UR QL SCN: POSITIVE
PCP UR QL: NEGATIVE
PH UR STRIP.AUTO: 5 [PH] (ref 3–11)
PH UR STRIP: 5 [PH] (ref 4.5–8)
PHOSPHATE SERPL-MCNC: 5.4 MG/DL (ref 2.3–4.7)
PLATELET # BLD AUTO: 404 X10(3)/MCL (ref 130–400)
PLATELET # BLD AUTO: 607 X10(3)/MCL (ref 130–400)
PMV BLD AUTO: 10.4 FL (ref 7.4–10.4)
PMV BLD AUTO: 10.5 FL (ref 7.4–10.4)
POTASSIUM SERPL-SCNC: 3.4 MMOL/L (ref 3.5–5.1)
PROT SERPL-MCNC: 10 GM/DL (ref 6.4–8.3)
PROT UR QL STRIP: 20 MG/DL
PROT UR STRIP-MCNC: <6.8 MG/DL
PROT/CREAT UR-RTO: <103 MG/GM
RBC # BLD AUTO: 4.45 X10(6)/MCL (ref 4.5–5.9)
RBC # BLD AUTO: 5.49 X10(6)/MCL (ref 4.5–5.9)
RBC #/AREA URNS AUTO: ABNORMAL /HPF
SARS-COV-2 RNA RESP QL NAA+PROBE: NOT DETECTED
SODIUM SERPL-SCNC: 130 MMOL/L (ref 136–145)
SP GR UR STRIP: 1.01 (ref 1–1.03)
SQUAMOUS #/AREA URNS LPF: ABNORMAL /LPF
TROPONIN I SERPL-MCNC: 0.01 NG/ML (ref 0–0.04)
UROBILINOGEN UR STRIP-ACNC: NORMAL
WBC # SPEC AUTO: 16.5 X10(3)/MCL (ref 4.5–11)
WBC # SPEC AUTO: 19.6 X10(3)/MCL (ref 4.5–11)
WBC #/AREA URNS AUTO: ABNORMAL /HPF

## 2020-12-28 LAB
ABS NEUT (OLG): 6.72 X10(3)/MCL (ref 2.1–9.2)
BASOPHILS # BLD AUTO: 0 X10(3)/MCL (ref 0–0.2)
BASOPHILS NFR BLD AUTO: 0 %
BUN SERPL-MCNC: 39 MG/DL (ref 8.9–20.6)
C DIFF INTERP: NEGATIVE
CALCIUM SERPL-MCNC: 8.7 MG/DL (ref 8.4–10.2)
CHLORIDE SERPL-SCNC: 95 MMOL/L (ref 98–107)
CO2 SERPL-SCNC: 30 MMOL/L (ref 22–29)
CREAT SERPL-MCNC: 1.96 MG/DL (ref 0.73–1.18)
CREAT/UREA NIT SERPL: 20
EOSINOPHIL # BLD AUTO: 0 X10(3)/MCL (ref 0–0.9)
EOSINOPHIL NFR BLD AUTO: 0 %
ERYTHROCYTE [DISTWIDTH] IN BLOOD BY AUTOMATED COUNT: 14.1 % (ref 11.5–14.5)
GLUCOSE SERPL-MCNC: 100 MG/DL (ref 74–100)
HCT VFR BLD AUTO: 32.8 % (ref 40–51)
HGB BLD-MCNC: 10.6 GM/DL (ref 13.5–17.5)
IMM GRANULOCYTES # BLD AUTO: 0.03 10*3/UL
IMM GRANULOCYTES NFR BLD AUTO: 0 %
LACTATE SERPL-SCNC: 0.6 MMOL/L (ref 0.5–2.2)
LYMPHOCYTES # BLD AUTO: 2.7 X10(3)/MCL (ref 0.6–4.6)
LYMPHOCYTES NFR BLD AUTO: 26 %
MCH RBC QN AUTO: 26.6 PG (ref 26–34)
MCHC RBC AUTO-ENTMCNC: 32.3 GM/DL (ref 31–37)
MCV RBC AUTO: 82.4 FL (ref 80–100)
MONOCYTES # BLD AUTO: 1 X10(3)/MCL (ref 0.1–1.3)
MONOCYTES NFR BLD AUTO: 9 %
NEUTROPHILS # BLD AUTO: 6.72 X10(3)/MCL (ref 2.1–9.2)
NEUTROPHILS NFR BLD AUTO: 64 %
PLATELET # BLD AUTO: 303 X10(3)/MCL (ref 130–400)
PMV BLD AUTO: 10.2 FL (ref 7.4–10.4)
POTASSIUM SERPL-SCNC: 3.5 MMOL/L (ref 3.5–5.1)
RBC # BLD AUTO: 3.98 X10(6)/MCL (ref 4.5–5.9)
SODIUM SERPL-SCNC: 135 MMOL/L (ref 136–145)
WBC # SPEC AUTO: 10.4 X10(3)/MCL (ref 4.5–11)

## 2020-12-29 LAB — FINAL CULTURE: NO GROWTH

## 2021-01-28 ENCOUNTER — HISTORICAL (OUTPATIENT)
Dept: ADMINISTRATIVE | Facility: HOSPITAL | Age: 41
End: 2021-01-28

## 2021-06-09 ENCOUNTER — HISTORICAL (OUTPATIENT)
Dept: ADMINISTRATIVE | Facility: HOSPITAL | Age: 41
End: 2021-06-09

## 2021-06-09 LAB
ABS NEUT (OLG): 6.64 X10(3)/MCL (ref 2.1–9.2)
ALBUMIN SERPL-MCNC: 4.2 GM/DL (ref 3.5–5)
ALBUMIN/GLOB SERPL: 0.9 RATIO (ref 1.1–2)
ALP SERPL-CCNC: 75 UNIT/L (ref 40–150)
ALT SERPL-CCNC: 37 UNIT/L (ref 0–55)
AST SERPL-CCNC: 24 UNIT/L (ref 5–34)
BASOPHILS # BLD AUTO: 0.1 X10(3)/MCL (ref 0–0.2)
BASOPHILS NFR BLD AUTO: 0 %
BILIRUB SERPL-MCNC: 0.3 MG/DL
BILIRUBIN DIRECT+TOT PNL SERPL-MCNC: 0.1 MG/DL (ref 0–0.5)
BILIRUBIN DIRECT+TOT PNL SERPL-MCNC: 0.2 MG/DL (ref 0–0.8)
BUN SERPL-MCNC: 18.5 MG/DL (ref 8.9–20.6)
CALCIUM SERPL-MCNC: 10.4 MG/DL (ref 8.4–10.2)
CHLORIDE SERPL-SCNC: 92 MMOL/L (ref 98–107)
CO2 SERPL-SCNC: 35 MMOL/L (ref 22–29)
CREAT SERPL-MCNC: 1.56 MG/DL (ref 0.73–1.18)
EOSINOPHIL # BLD AUTO: 0.4 X10(3)/MCL (ref 0–0.9)
EOSINOPHIL NFR BLD AUTO: 3 %
ERYTHROCYTE [DISTWIDTH] IN BLOOD BY AUTOMATED COUNT: 14.5 % (ref 11.5–14.5)
FERRITIN SERPL-MCNC: 60.55 NG/ML (ref 21.81–274.66)
GLOBULIN SER-MCNC: 4.5 GM/DL (ref 2.4–3.5)
GLUCOSE SERPL-MCNC: 112 MG/DL (ref 74–100)
HCT VFR BLD AUTO: 40.5 % (ref 40–51)
HGB BLD-MCNC: 12.6 GM/DL (ref 13.5–17.5)
IMM GRANULOCYTES # BLD AUTO: 0.02 10*3/UL
IMM GRANULOCYTES NFR BLD AUTO: 0 %
IRON SATN MFR SERPL: 12 % (ref 20–50)
IRON SERPL-MCNC: 43 UG/DL (ref 65–175)
LYMPHOCYTES # BLD AUTO: 3.2 X10(3)/MCL (ref 0.6–4.6)
LYMPHOCYTES NFR BLD AUTO: 28 %
MCH RBC QN AUTO: 25.9 PG (ref 26–34)
MCHC RBC AUTO-ENTMCNC: 31.1 GM/DL (ref 31–37)
MCV RBC AUTO: 83.2 FL (ref 80–100)
MONOCYTES # BLD AUTO: 1 X10(3)/MCL (ref 0.1–1.3)
MONOCYTES NFR BLD AUTO: 9 %
NEUTROPHILS # BLD AUTO: 6.64 X10(3)/MCL (ref 2.1–9.2)
NEUTROPHILS NFR BLD AUTO: 59 %
NRBC BLD AUTO-RTO: 0 % (ref 0–0.2)
PLATELET # BLD AUTO: 378 X10(3)/MCL (ref 130–400)
PMV BLD AUTO: 10.7 FL (ref 7.4–10.4)
POTASSIUM SERPL-SCNC: 3.7 MMOL/L (ref 3.5–5.1)
PROT SERPL-MCNC: 8.7 GM/DL (ref 6.4–8.3)
PTH-INTACT SERPL-MCNC: 84.1 PG/ML (ref 8.7–77)
RBC # BLD AUTO: 4.87 X10(6)/MCL (ref 4.5–5.9)
SODIUM SERPL-SCNC: 139 MMOL/L (ref 136–145)
TIBC SERPL-MCNC: 304 UG/DL (ref 69–240)
TIBC SERPL-MCNC: 347 UG/DL (ref 250–450)
TRANSFERRIN SERPL-MCNC: 306 MG/DL (ref 174–364)
WBC # SPEC AUTO: 11.2 X10(3)/MCL (ref 4.5–11)

## 2021-06-18 ENCOUNTER — HISTORICAL (OUTPATIENT)
Dept: ADMINISTRATIVE | Facility: HOSPITAL | Age: 41
End: 2021-06-18

## 2021-06-18 LAB
ALBUMIN SERPL-MCNC: 3.6 GM/DL (ref 3.5–5)
ALBUMIN/GLOB SERPL: 0.9 RATIO (ref 1.1–2)
ALP SERPL-CCNC: 79 UNIT/L (ref 40–150)
ALT SERPL-CCNC: 44 UNIT/L (ref 0–55)
AST SERPL-CCNC: 32 UNIT/L (ref 5–34)
BILIRUB SERPL-MCNC: 0.2 MG/DL
BILIRUBIN DIRECT+TOT PNL SERPL-MCNC: 0.1 MG/DL (ref 0–0.5)
BILIRUBIN DIRECT+TOT PNL SERPL-MCNC: 0.1 MG/DL (ref 0–0.8)
BUN SERPL-MCNC: 15.7 MG/DL (ref 8.9–20.6)
CALCIUM SERPL-MCNC: 9.3 MG/DL (ref 8.4–10.2)
CHLORIDE SERPL-SCNC: 103 MMOL/L (ref 98–107)
CHOLEST SERPL-MCNC: 186 MG/DL
CHOLEST/HDLC SERPL: 4 {RATIO} (ref 0–5)
CO2 SERPL-SCNC: 30 MMOL/L (ref 22–29)
CREAT SERPL-MCNC: 1.1 MG/DL (ref 0.73–1.18)
CREAT UR-MCNC: 147.4 MG/DL (ref 58–161)
DEPRECATED CALCIDIOL+CALCIFEROL SERPL-MC: 30.5 NG/ML (ref 30–80)
EST. AVERAGE GLUCOSE BLD GHB EST-MCNC: 119.8 MG/DL
GLOBULIN SER-MCNC: 4.1 GM/DL (ref 2.4–3.5)
GLUCOSE SERPL-MCNC: 99 MG/DL (ref 74–100)
HBA1C MFR BLD: 5.8 %
HDLC SERPL-MCNC: 42 MG/DL (ref 35–60)
LDLC SERPL CALC-MCNC: 90 MG/DL (ref 50–140)
POTASSIUM SERPL-SCNC: 4.3 MMOL/L (ref 3.5–5.1)
PROT SERPL-MCNC: 7.7 GM/DL (ref 6.4–8.3)
PROT UR STRIP-MCNC: 19.1 MG/DL
PROT/CREAT UR-RTO: 129.6 MG/GM CR
PTH-INTACT SERPL-MCNC: 138.8 PG/ML (ref 8.7–77)
SODIUM SERPL-SCNC: 141 MMOL/L (ref 136–145)
TRIGL SERPL-MCNC: 269 MG/DL (ref 34–140)
TSH SERPL-ACNC: 1.39 UIU/ML (ref 0.35–4.94)
VLDLC SERPL CALC-MCNC: 54 MG/DL

## 2021-06-29 ENCOUNTER — HISTORICAL (OUTPATIENT)
Dept: GASTROENTEROLOGY | Facility: CLINIC | Age: 41
End: 2021-06-29

## 2021-12-01 ENCOUNTER — HISTORICAL (OUTPATIENT)
Dept: ADMINISTRATIVE | Facility: HOSPITAL | Age: 41
End: 2021-12-01

## 2021-12-01 LAB — SARS-COV-2 RNA RESP QL NAA+PROBE: NEGATIVE

## 2022-01-03 LAB — EST CREAT CLEARANCE SER (OHS): 64.23 ML/MIN

## 2022-01-28 ENCOUNTER — HISTORICAL (OUTPATIENT)
Dept: ADMINISTRATIVE | Facility: HOSPITAL | Age: 42
End: 2022-01-28

## 2022-01-28 ENCOUNTER — HOSPITAL ENCOUNTER (OUTPATIENT)
Dept: MEDSURG UNIT | Facility: HOSPITAL | Age: 42
End: 2022-01-30
Attending: STUDENT IN AN ORGANIZED HEALTH CARE EDUCATION/TRAINING PROGRAM

## 2022-01-28 LAB
ABS NEUT (OLG): 12.35 (ref 2.1–9.2)
ALBUMIN SERPL-MCNC: 4.1 G/DL (ref 3.5–5)
ALBUMIN/GLOB SERPL: 0.8 {RATIO} (ref 1.1–2)
ALP SERPL-CCNC: 79 U/L (ref 40–150)
ALT SERPL-CCNC: 21 U/L (ref 0–55)
APPEARANCE, UA: CLEAR
AST SERPL-CCNC: 16 U/L (ref 5–34)
BACTERIA SPEC CULT: NORMAL
BASOPHILS # BLD AUTO: 0 10*3/UL (ref 0–0.2)
BASOPHILS NFR BLD AUTO: 0 %
BILIRUB SERPL-MCNC: 0.3 MG/DL
BILIRUB UR QL STRIP: NEGATIVE
BILIRUBIN DIRECT+TOT PNL SERPL-MCNC: 0.1 (ref 0–0.8)
BILIRUBIN DIRECT+TOT PNL SERPL-MCNC: 0.2 (ref 0–0.5)
BUN SERPL-MCNC: 68.4 MG/DL (ref 8.9–20.6)
CALCIUM SERPL-MCNC: 10.8 MG/DL (ref 8.7–10.5)
CHLORIDE SERPL-SCNC: 80 MMOL/L (ref 98–107)
CO2 SERPL-SCNC: 30 MMOL/L (ref 22–29)
COLOR UR: COLORLESS
CREAT SERPL-MCNC: 6.92 MG/DL (ref 0.73–1.18)
EOSINOPHIL # BLD AUTO: 0 10*3/UL (ref 0–0.9)
EOSINOPHIL NFR BLD AUTO: 0 %
ERYTHROCYTE [DISTWIDTH] IN BLOOD BY AUTOMATED COUNT: 14.9 % (ref 11.5–14.5)
FLAG2 (OHS): 60
FLAG3 (OHS): 80
FLAGS (OHS): 90
GLOBULIN SER-MCNC: 5 G/DL (ref 2.4–3.5)
GLUCOSE (UA): NORMAL
GLUCOSE SERPL-MCNC: 105 MG/DL (ref 74–100)
HCT VFR BLD AUTO: 36.9 % (ref 40–51)
HEMOLYSIS INTERF INDEX SERPL-ACNC: <0
HGB BLD-MCNC: 11.9 G/DL (ref 13.5–17.5)
HGB UR QL STRIP: NORMAL
HYALINE CASTS #/AREA URNS LPF: NORMAL /[LPF]
ICTERIC INTERF INDEX SERPL-ACNC: 0
IMM GRANULOCYTES # BLD AUTO: 0.06 10*3/UL
IMM GRANULOCYTES NFR BLD AUTO: 0 %
KETONES UR QL STRIP: NEGATIVE
LACTATE SERPL-SCNC: 1.9 MMOL/L (ref 0.5–2.2)
LEUKOCYTE ESTERASE UR QL STRIP: NEGATIVE
LIPEMIC INTERF INDEX SERPL-ACNC: 17
LOW EVENT # SUSPECT FLAG (OHS): 90
LYMPHOCYTES # BLD AUTO: 1.5 10*3/UL (ref 0.6–4.6)
LYMPHOCYTES NFR BLD AUTO: 10 %
MANUAL DIFF? (OHS): NO
MCH RBC QN AUTO: 25.5 PG (ref 26–34)
MCHC RBC AUTO-ENTMCNC: 32.2 G/DL (ref 31–37)
MCV RBC AUTO: 79.2 FL (ref 80–100)
MO BLASTS SUSPECT FLAG (OHS): 30
MONOCYTES # BLD AUTO: 0.7 10*3/UL (ref 0.1–1.3)
MONOCYTES NFR BLD AUTO: 5 %
MUCOUS THREADS URNS QL MICRO: NORMAL
NEUTROPHILS # BLD AUTO: 12.35 10*3/UL (ref 2.1–9.2)
NEUTROPHILS NFR BLD AUTO: 84 %
NITRITE UR QL STRIP: NEGATIVE
NRBC BLD AUTO-RTO: 0 % (ref 0–0.2)
PH UR STRIP: 6.5 [PH] (ref 4.5–8)
PLATELET # BLD AUTO: 519 10*3/UL (ref 130–400)
PLATELET CLUMPS SUSPECT FLAG (OHS): 10
PMV BLD AUTO: 9.6 FL (ref 7.4–10.4)
POTASSIUM SERPL-SCNC: 3.3 MMOL/L (ref 3.5–5.1)
PROT SERPL-MCNC: 9.1 G/DL (ref 6.4–8.3)
PROT UR QL STRIP: NORMAL
RBC # BLD AUTO: 4.66 10*6/UL (ref 4.5–5.9)
RBC #/AREA URNS HPF: NORMAL /[HPF] (ref 0–5)
SARS-COV-2 AG RESP QL IA.RAPID: NEGATIVE
SODIUM SERPL-SCNC: 128 MMOL/L (ref 136–145)
SP GR UR STRIP: 1.01 (ref 1–1.03)
SQUAMOUS EPITHELIAL, UA: NORMAL
TROPONIN I SERPL-MCNC: <0.01 NG/ML (ref 0–0.04)
UROBILINOGEN UR STRIP-ACNC: NORMAL
WBC # SPEC AUTO: 14.7 10*3/UL (ref 4.5–11)
WBC #/AREA URNS HPF: NORMAL /[HPF] (ref 0–5)

## 2022-01-29 LAB
ABS NEUT (OLG): 6.79 (ref 2.1–9.2)
ALBUMIN SERPL-MCNC: 3.1 G/DL (ref 3.5–5)
ALBUMIN/GLOB SERPL: 0.9 {RATIO} (ref 1.1–2)
ALP SERPL-CCNC: 62 U/L (ref 40–150)
ALT SERPL-CCNC: 15 U/L (ref 0–55)
AMPHET UR QL SCN: NEGATIVE
AST SERPL-CCNC: 11 U/L (ref 5–34)
BARBITURATE SCN PRESENT UR: NEGATIVE
BASOPHILS # BLD AUTO: 0 10*3/UL (ref 0–0.2)
BASOPHILS NFR BLD AUTO: 0 %
BENZODIAZ UR QL SCN: NEGATIVE
BILIRUB SERPL-MCNC: 0.3 MG/DL
BILIRUBIN DIRECT+TOT PNL SERPL-MCNC: 0.1 (ref 0–0.5)
BILIRUBIN DIRECT+TOT PNL SERPL-MCNC: 0.2 (ref 0–0.8)
BUN SERPL-MCNC: 59 MG/DL (ref 8.9–20.6)
CALCIUM SERPL-MCNC: 8.9 MG/DL (ref 8.7–10.5)
CANNABINOIDS UR QL SCN: POSITIVE
CHLORIDE SERPL-SCNC: 94 MMOL/L (ref 98–107)
CO2 SERPL-SCNC: 27 MMOL/L (ref 22–29)
COCAINE UR QL SCN: NEGATIVE
CREAT SERPL-MCNC: 4.8 MG/DL (ref 0.73–1.18)
EOSINOPHIL # BLD AUTO: 0.1 10*3/UL (ref 0–0.9)
EOSINOPHIL NFR BLD AUTO: 1 %
ERYTHROCYTE [DISTWIDTH] IN BLOOD BY AUTOMATED COUNT: 15 % (ref 11.5–14.5)
FENTANYL UR QL SCN: NEGATIVE
FERRITIN SERPL-MCNC: 241.31 NG/ML (ref 21.81–274.66)
FLAG2 (OHS): 60
FLAG3 (OHS): 80
FLAGS (OHS): 90
GLOBULIN SER-MCNC: 3.6 G/DL (ref 2.4–3.5)
GLUCOSE SERPL-MCNC: 88 MG/DL (ref 74–100)
HCT VFR BLD AUTO: 29.1 % (ref 40–51)
HEMOLYSIS INTERF INDEX SERPL-ACNC: 1
HEMOLYSIS INTERF INDEX SERPL-ACNC: 1
HGB BLD-MCNC: 9.4 G/DL (ref 13.5–17.5)
ICTERIC INTERF INDEX SERPL-ACNC: 0
IMM GRANULOCYTES # BLD AUTO: 0.04 10*3/UL
IMM GRANULOCYTES NFR BLD AUTO: 0 %
IRON SATN MFR SERPL: 12 % (ref 20–50)
IRON SERPL-MCNC: 37 UG/DL (ref 65–175)
LIPEMIC INTERF INDEX SERPL-ACNC: 1
LOW EVENT # SUSPECT FLAG (OHS): 90
LYMPHOCYTES # BLD AUTO: 2.4 10*3/UL (ref 0.6–4.6)
LYMPHOCYTES NFR BLD AUTO: 24 %
MAGNESIUM SERPL-MCNC: 2.1 MG/DL (ref 1.6–2.6)
MANUAL DIFF? (OHS): NO
MCH RBC QN AUTO: 25.7 PG (ref 26–34)
MCHC RBC AUTO-ENTMCNC: 32.3 G/DL (ref 31–37)
MCV RBC AUTO: 79.5 FL (ref 80–100)
MDMA UR QL SCN: NEGATIVE
MO BLASTS SUSPECT FLAG (OHS): 40
MONOCYTES # BLD AUTO: 0.8 10*3/UL (ref 0.1–1.3)
MONOCYTES NFR BLD AUTO: 7 %
NEUTROPHILS # BLD AUTO: 6.79 10*3/UL (ref 2.1–9.2)
NEUTROPHILS NFR BLD AUTO: 67 %
NRBC BLD AUTO-RTO: 0 % (ref 0–0.2)
OPIATES UR QL SCN: NEGATIVE
OSMOLALITY SERPL: 295 MOSM/KG (ref 280–300)
OSMOLALITY UR: 220 MOSM/KG (ref 300–1300)
PCP UR QL: NEGATIVE
PH UR STRIP.AUTO: 6.5 [PH] (ref 3–11)
PHOSPHATE SERPL-MCNC: 5.2 MG/DL (ref 2.3–4.7)
PLATELET # BLD AUTO: 362 10*3/UL (ref 130–400)
PLATELET CLUMPS SUSPECT FLAG (OHS): 10
PMV BLD AUTO: 9.9 FL (ref 7.4–10.4)
POTASSIUM SERPL-SCNC: 3.1 MMOL/L (ref 3.5–5.1)
PROT SERPL-MCNC: 6.7 G/DL (ref 6.4–8.3)
RBC # BLD AUTO: 3.66 10*6/UL (ref 4.5–5.9)
SODIUM SERPL-SCNC: 134 MMOL/L (ref 136–145)
SODIUM UR-SCNC: 37 MMOL/L
TIBC SERPL-MCNC: 272 UG/DL (ref 69–240)
TIBC SERPL-MCNC: 309 UG/DL (ref 250–450)
TRANSFERRIN SERPL-MCNC: 279 MG/DL (ref 174–364)
WBC # SPEC AUTO: 10.2 10*3/UL (ref 4.5–11)

## 2022-01-30 LAB
ABS NEUT (OLG): 8.06 (ref 2.1–9.2)
ALBUMIN SERPL-MCNC: 3.4 G/DL (ref 3.5–5)
ALBUMIN/GLOB SERPL: 0.8 {RATIO} (ref 1.1–2)
ALP SERPL-CCNC: 66 U/L (ref 40–150)
ALT SERPL-CCNC: 14 U/L (ref 0–55)
AST SERPL-CCNC: 11 U/L (ref 5–34)
BASOPHILS # BLD AUTO: 0 10*3/UL (ref 0–0.2)
BASOPHILS NFR BLD AUTO: 0 %
BILIRUB SERPL-MCNC: 0.2 MG/DL
BILIRUBIN DIRECT+TOT PNL SERPL-MCNC: 0.1 (ref 0–0.5)
BILIRUBIN DIRECT+TOT PNL SERPL-MCNC: 0.1 (ref 0–0.8)
BUN SERPL-MCNC: 33.5 MG/DL (ref 8.9–20.6)
CALCIUM SERPL-MCNC: 9.6 MG/DL (ref 8.7–10.5)
CHLORIDE SERPL-SCNC: 98 MMOL/L (ref 98–107)
CO2 SERPL-SCNC: 29 MMOL/L (ref 22–29)
CREAT SERPL-MCNC: 1.89 MG/DL (ref 0.73–1.18)
EOSINOPHIL # BLD AUTO: 0 10*3/UL (ref 0–0.9)
EOSINOPHIL NFR BLD AUTO: 0 %
ERYTHROCYTE [DISTWIDTH] IN BLOOD BY AUTOMATED COUNT: 15.3 % (ref 11.5–14.5)
FLAG2 (OHS): 60
FLAG3 (OHS): 80
FLAGS (OHS): 90
GLOBULIN SER-MCNC: 4.1 G/DL (ref 2.4–3.5)
GLUCOSE SERPL-MCNC: 74 MG/DL (ref 74–100)
HCT VFR BLD AUTO: 32.4 % (ref 40–51)
HEMOLYSIS INTERF INDEX SERPL-ACNC: <0
HGB BLD-MCNC: 10.3 G/DL (ref 13.5–17.5)
ICTERIC INTERF INDEX SERPL-ACNC: 0
IMM GRANULOCYTES # BLD AUTO: 0.04 10*3/UL
IMM GRANULOCYTES NFR BLD AUTO: 0 %
LIPEMIC INTERF INDEX SERPL-ACNC: 4
LOW EVENT # SUSPECT FLAG (OHS): 90
LYMPHOCYTES # BLD AUTO: 1.7 10*3/UL (ref 0.6–4.6)
LYMPHOCYTES NFR BLD AUTO: 16 %
MANUAL DIFF? (OHS): NO
MCH RBC QN AUTO: 25.8 PG (ref 26–34)
MCHC RBC AUTO-ENTMCNC: 31.8 G/DL (ref 31–37)
MCV RBC AUTO: 81.2 FL (ref 80–100)
MO BLASTS SUSPECT FLAG (OHS): 30
MONOCYTES # BLD AUTO: 0.9 10*3/UL (ref 0.1–1.3)
MONOCYTES NFR BLD AUTO: 8 %
NEUTROPHILS # BLD AUTO: 8.06 10*3/UL (ref 2.1–9.2)
NEUTROPHILS NFR BLD AUTO: 75 %
NRBC BLD AUTO-RTO: 0 % (ref 0–0.2)
PLATELET # BLD AUTO: 368 10*3/UL (ref 130–400)
PLATELET CLUMPS SUSPECT FLAG (OHS): 20
PMV BLD AUTO: 10.2 FL (ref 7.4–10.4)
POTASSIUM SERPL-SCNC: 3.8 MMOL/L (ref 3.5–5.1)
PROT SERPL-MCNC: 7.5 G/DL (ref 6.4–8.3)
RBC # BLD AUTO: 3.99 10*6/UL (ref 4.5–5.9)
SODIUM SERPL-SCNC: 135 MMOL/L (ref 136–145)
WBC # SPEC AUTO: 10.8 10*3/UL (ref 4.5–11)

## 2022-02-14 ENCOUNTER — HISTORICAL (OUTPATIENT)
Dept: RADIOLOGY | Facility: HOSPITAL | Age: 42
End: 2022-02-14

## 2022-04-30 NOTE — ED PROVIDER NOTES
Patient:   Huey Pinzon            MRN: [id number]            FIN: [id number]               Age:   40 years     Sex:  Male     :  1980   Associated Diagnoses:   Orthostatic hypotension; Acute hypokalemia; JORJE (acute kidney injury)   Author:   Abraham Garcia PA-C      Basic Information   Additional information: Chief Complaint from Nursing Triage Note : Chief Complaint   2020 16:56 CDT      Chief Complaint           Pt to ed c/o near syncopal episode today with poor appetite x 1 week. Denies any other symptoms.  .      History of Present Illness   The patient presents with near syncope and 40-year-old male history of hypertension currently on an ACE inhibitor presents to emergency department with presyncope symptoms while at work today.  Patient reports he has been feeling depressed and anhedonic for the past week and has had decreased by mouth intake.  Has been urinating.  He denies any nausea vomiting he denies any diuretic use.  The onset was 6  hours ago.  The course/duration of symptoms is constant.  The location where the incident occurred was at work.  The exacerbating factor is exertion.  There are relieving factors including rest and lying down.  Risk factors consist of hypertension.  Prior episodes: orthostasis.  Therapy today: none.  Preceding symptoms: lightheaded.  Associated symptoms: dizziness, denies chest pain, denies abdominal pain, denies nausea, denies vomiting, denies fever, denies chills, denies shortness of breath and denies headache.  Associated injury to the none.        Review of Systems   Constitutional symptoms:  No fever, no chills.    Skin symptoms:  No rash, no lesion.    Eye symptoms:  Vision unchanged, No blurred vision,    ENMT symptoms:  No sore throat, no nasal congestion.    Respiratory symptoms:  No shortness of breath, no cough.    Cardiovascular symptoms:  No chest pain, no palpitations.    Gastrointestinal symptoms:  No abdominal pain, no nausea, no  vomiting, no diarrhea, no constipation.    Genitourinary symptoms:  No dysuria, no hematuria.    Musculoskeletal symptoms:  No back pain, no Muscle pain.    Neurologic symptoms:  Negative except as documented in HPI.      Health Status   Allergies:    Allergic Reactions (Selected)  No Known Allergies,    Allergies (1) Active Reaction  No Known Allergies None Documented  .   Medications:  (Selected)   Inpatient Medications  Ordered  Compazine: 10 mg, form: Injection, IV Push, Once, first dose 05/27/19 6:37:00 CDT, stop date 05/27/19 6:37:00 CDT, STAT  Lactated Ringers 1000ml 1,000 mL: 1,000 mL, 1,000 mL, IV, 999 mL/hr, start date 08/11/20 18:43:00 CDT, 1.93, m2  Lactated Ringers 1000ml 2,000 mL: 2,000 mL, 2,000 mL, IV, 1,000 mL/hr, start date 08/11/20 17:06:00 CDT, 1.93, m2  Magnesium Sulfate 1gm/100ml IVPB (Premix): 2 gm, form: Infusion, IV Piggyback, Once, Infuse over: 1 hr, first dose 08/11/20 18:43:00 CDT, stop date 08/11/20 18:43:00 CDT, STAT  potassium chloride 10 mEq/100 mL intravenous solution: 20 mEq, form: Infusion, IV Piggyback, Once, Infuse over: 2 hr, first dose 08/11/20 18:42:00 CDT, stop date 08/11/20 18:42:00 CDT, STAT  potassium chloride 20 mEq oral powder for reconstitution: 40 mEq, form: Powder-Recon, Oral, Once, first dose 08/11/20 18:41:00 CDT, stop date 08/11/20 18:41:00 CDT, STAT  Prescriptions  Prescribed  Colace 100 mg oral capsule: 100 mg = 1 cap(s), Oral, BID, PRN PRN for constipation, # 60 cap(s), 0 Refill(s), Pharmacy: [facility], 159, cm, Height/Length Dosing, 06/23/20 23:55:00 CDT, 76.3, kg, Weight Dosing, 06/23/20 23:55:00 CDT  Protonix 40 mg ORAL enteric coated tablet: 40 mg = 1 tab(s), Oral, Daily, # 30 tab(s), 2 Refill(s), Pharmacy: [facility], 159, cm, Height/Length Dosing, 07/06/20 12:54:00 CDT, 99, kg, Weight Dosing, 07/06/20 12:54:00 CDT  capsaicin 0.025% topical cream: 1 wendy, TOP, Daily, PRN PRN nausea/vomiting, Apply a thin layer over abdomen. Avoid  contact with face and eyes., # 60 gm, 0 Refill(s), Pharmacy: [facility], 160, cm, Height/Length Dosing, 06/23/20 8:24:00 CDT, 95, kg, Weight Dosing, 06/23/20...  lisinopril 20 mg oral tablet: 20 mg = 1 tab(s), Oral, Daily, # 90 tab(s), 1 Refill(s), Pharmacy: [facility], 159, cm, Height/Length Dosing, 07/06/20 12:54:00 CDT, 99, kg, Weight Dosing, 07/06/20 12:54:00 CDT  sucralfate 1 g oral tablet: 1 gm = 1 tab(s), Oral, QID, # 56 tab(s), 0 Refill(s), Pharmacy: [facility], 159, cm, Height/Length Dosing, 06/23/20 23:55:00 CDT, 76.3, kg, Weight Dosing, 06/23/20 23:55:00 CDT.      Past Medical/ Family/ Social History   Medical history:    Active  GERD - Gastro-esophageal reflux disease ([id number])  Resolved  Hypertension ([id number]):  Resolved..   Surgical history:    Esophagogastroduodenoscopy on 7/17/2019 at 38 Years.  Comments:  7/17/2019 13:22 Katie Rojas RN  auto-populated from documented surgical case  Biopsy Gastrointestinal on 7/17/2019 at 38 Years.  Comments:  7/17/2019 13:22 Katie Rojas RN  auto-populated from documented surgical case  Biopsy Gastrointestinal on 7/18/2018 at 37 Years.  Comments:  7/18/2018 16:49 Katie Rojas RN  auto-populated from documented surgical case  Esophagogastroduodenoscopy on 7/18/2018 at 37 Years.  Comments:  7/18/2018 16:49 Katie Rojas RN  auto-populated from documented surgical case  Esophagogastroduodenoscopy on 6/15/2016 at 35 Years.  Comments:  6/15/2016 16:55 Katie Rojas RN  auto-populated from documented surgical case  inguinal hernia repair in the month of 3/1982 at 8 Months..   Family history:    Hypertension.  Mother  Father  Hyperlipidemia.  Mother: onset at 45 .  .   Social history:    Social & Psychosocial Habits    Alcohol  02/20/2020  Use: Current    Frequency: 1-2 times per week    Employment/School  02/20/2020  Status: Employed    Exercise  02/20/2020  Duration  (average number of minutes): 0    Home/Environment  02/20/2020  Lives with: Father, Mother    Living situation: Home/Independent    Nutrition/Health  02/20/2020  Home Diet Regular    Substance Use  07/06/2020  Use: Current    Type: Marijuana    Frequency: Daily    Tobacco  07/06/2020  Use: Former smoker, quit more    Patient Wants Consult For Cessation Counseling No    08/11/2020  Use: Former smoker, quit more    Patient Wants Consult For Cessation Counseling N/A    Abuse/Neglect  07/06/2020  SHX Any signs of abuse or neglect No    08/11/2020  SHX Any signs of abuse or neglect No  .   Problem list:    Active Problems (10)  Cannabis hyperemesis syndrome co-occurrent and due to cannabis abuse   GERD - Gastro-esophageal reflux disease   Hypertension   Knowledge deficit   Marijuana   Nausea   Nausea and vomiting   Obesity   Tobacco user   Vomiting   .      Physical Examination               Vital Signs   Vital Signs   8/11/2020 18:30 CDT      Peripheral Pulse Rate     86 bpm                             Heart Rate Monitored      86 bpm                             Respiratory Rate          20 br/min                             SpO2                      99 %                             Systolic Blood Pressure   116 mmHg                             Diastolic Blood Pressure  61 mmHg                             Mean Arterial Pressure, Cuff              79 mmHg    8/11/2020 18:00 CDT      Peripheral Pulse Rate     102 bpm  HI                             Heart Rate Monitored      101 bpm  HI                             Respiratory Rate          17 br/min                             SpO2                      100 %                             Oxygen Therapy            Room air                             Systolic Blood Pressure   120 mmHg                             Diastolic Blood Pressure  67 mmHg                             Mean Arterial Pressure, Cuff              85 mmHg    8/11/2020 17:30 CDT      Peripheral Pulse Rate      85 bpm                             Heart Rate Monitored      80 bpm                             Respiratory Rate          13 br/min                             SpO2                      100 %                             Systolic Blood Pressure   83 mmHg  LOW                             Diastolic Blood Pressure  56 mmHg  LOW                             Mean Arterial Pressure, Cuff              65 mmHg    8/11/2020 17:13 CDT      Peripheral Pulse Rate     99 bpm                             Heart Rate Monitored      100 bpm                             Respiratory Rate          22 br/min                             SpO2                      97 %                             Systolic Blood Pressure   80 mmHg  LOW                             Diastolic Blood Pressure  53 mmHg  LOW                             Mean Arterial Pressure, Cuff              62 mmHg    8/11/2020 17:00 CDT      Oxygen Therapy            Room air    8/11/2020 16:56 CDT      Temperature Temporal Artery               36.3 DegC                             Peripheral Pulse Rate     105 bpm  HI                             Respiratory Rate          18 br/min                             SpO2                      99 %                             Oxygen Therapy            Room air                             Systolic Blood Pressure   60 mmHg  LOW                             Diastolic Blood Pressure  37 mmHg  LOW  .      Vital Signs (last 24 hrs)_____  Last Charted___________  Heart Rate Peripheral   86 bpm  (AUG 11 18:30)  Resp Rate         20 br/min  (AUG 11 18:30)  SBP      116 mmHg  (AUG 11 18:30)  DBP      61 mmHg  (AUG 11 18:30)  SpO2      99 %  (AUG 11 18:30)  Weight      90 kg  (AUG 11 16:56)  .   Measurements   8/11/2020 16:56 CDT      Weight Dosing             90 kg                             Weight Measured           90 kg                             Weight Measured and Calculated in Lbs     198.41 lb                             Height/Length Dosing       160 cm                             Height/Length Estimated   160 cm  .   Basic Oxygen Information   8/11/2020 18:30 CDT      SpO2                      99 %    8/11/2020 18:00 CDT      SpO2                      100 %                             Oxygen Therapy            Room air    8/11/2020 17:30 CDT      SpO2                      100 %    8/11/2020 17:13 CDT      SpO2                      97 %    8/11/2020 17:00 CDT      Oxygen Therapy            Room air    8/11/2020 16:56 CDT      SpO2                      99 %                             Oxygen Therapy            Room air  .   General:  Alert, no acute distress.    Skin:  Warm, dry, pink, intact, no pallor, no rash, normal for ethnicity.    Head:  Normocephalic, atraumatic.    Neck:  Supple.   Eye:  Normal conjunctiva, vision grossly normal.    Cardiovascular:  Regular rate and rhythm, No murmur, Normal peripheral perfusion, No edema.    Respiratory:  Lungs are clear to auscultation, respirations are non-labored, breath sounds are equal, Symmetrical chest wall expansion.    Chest wall:  No tenderness, No deformity.    Musculoskeletal:  Normal ROM.   Gastrointestinal:  Soft, Nontender, Non distended.    Neurological:  Alert and oriented to person, place, time, and situation, No focal neurological deficit observed, normal sensory observed, normal motor observed, normal speech observed, normal coordination observed.    Lymphatics:  No lymphadenopathy.   Psychiatric:  Cooperative, appropriate mood & affect, normal judgment.       Medical Decision Making   Electrocardiogram:  Time 8/11/2020 17:30:00, rate 94, normal sinus rhythm, No ST-T changes, no ectopy, EP Interp, Mild QTC prolongation at 500 ms.    Results review:  Lab results : Lab View   8/11/2020 17:11 CDT      Sodium Lvl                130 mmol/L  LOW                             Potassium Lvl             2.5 mmol/L  CRIT                             Chloride                  86 mmol/L  LOW                              CO2                       34 mmol/L  HI                             Calcium Lvl               9.6 mg/dL                             Magnesium Lvl             2.5 mg/dL                             Glucose Lvl               158 mg/dL  HI                             BUN                       18 mg/dL                             Creatinine                2.40 mg/dL  HI                             eGFR-AA                   39 mL/min  LOW                             eGFR-VEL                  32 mL/min  LOW                             Bili Total                0.4 mg/dL                             Bili Direct               0.1 mg/dL                             Bili Indirect             0.3 mg/dL                             AST                       29 unit/L                             ALT                       44 unit/L                             Alk Phos                  90 unit/L                             Total Protein             8.6 gm/dL  HI                             Albumin Lvl               4.0 gm/dL                             Globulin                  4.60 gm/mL  HI                             A/G Ratio                 0.9 ratio  LOW                             Troponin-I                <0.015 ng/mL                             WBC                       12.2 x10(3)/mcL  HI                             RBC                       5.28 x10(6)/mcL                             Hgb                       12.7 gm/dL  LOW                             Hct                       41.2 %                             Platelet                  425 x10(3)/mcL  HI                             MCV                       78.0 fL  LOW                             MCH                       24.1 pg  LOW                             MCHC                      30.8 gm/dL  LOW                             RDW                       15.0 %  HI                             MPV                       11.2 fL  HI                              Abs Neut                  7.64 x10(3)/mcL                             Neutro Auto               63 %  NA                             Lymph Auto                29 %  NA                             Mono Auto                 7 %  NA                             Eos Auto                  1 %  NA                             Abs Eos                   0.1 x10(3)/mcL                             Basophil Auto             0 %  NA                             Abs Neutro                7.64 x10(3)/mcL                             Abs Lymph                 3.6 x10(3)/mcL                             Abs Mono                  0.8 x10(3)/mcL                             Abs Baso                  0.1 x10(3)/mcL                             IG%                       0 %  NA                             IG#                       0.0400  NA    8/11/2020 17:00 CDT      U pH                      7.5                             UA Appear                 Clear                             UA Color                  YELLOW                             UA Spec Grav              1.015                             UA Bili                   Negative                             UA pH                     7.5                             UA Urobilinogen           Normal                             UA Blood                  Negative                             UA Glucose                Negative                             UA Ketones                Negative                             UA Protein                30 mg/dL                             UA Nitrite                Negative                             UA Leuk Est               Negative                             UA WBC Interp             0-2 /HPF                             UA RBC Interp             0-2 /HPF                             UA Bact Interp            None Seen /HPF                             UA Squam Epi Interp        /LPF                             UA Hyal Cast Interp       3-5  /LPF                             U Temp                    23.0 DegC                             U Amph Scr                Negative                             U Minnie Scr                Negative                             U Benzodia Scr            Negative                             U Cannab Scr              Positive                             U Opiate Scr              Negative                             U Phencyclidine Scr       Negative  ,    Labs (Last four charted values)  WBC                  H 12.2 (AUG 11)   Hgb                  L 12.7 (AUG 11)   Hct                  41.2 (AUG 11)   Plt                  H 425 (AUG 11)   Na                   L 130 (AUG 11)   K                    C 2.5 (AUG 11)   CO2                  H 34 (AUG 11)   Cl                   L 86 (AUG 11)   Cr                   H 2.40 (AUG 11)   BUN                  18 (AUG 11)   Glucose Random       H 158 (AUG 11) .    Radiology results:  Rad Results (ST)  < 12 hrs   Accession: [id number]  Order: XR Chest 1 View  Report Dt/Tm: 08/11/2020 18:12  Report:   EXAMINATION: AP view the chest     EXAMINATION DATE: 8/11/2020     COMPARISON: 6/24/2020     TECHNIQUE: As above     CLINICAL HISTORY: Syncope     FINDINGS:     The cardiomediastinal silhouette and pulmonary vasculature are normal.     The lungs and pleural spaces are clear.     IMPRESSION:     No acute cardiopulmonary process.    .      Reexamination/ Reevaluation   Time: 8/11/2020 19:05:00 .   Vital signs   Basic Oxygen Information   8/11/2020 18:30 CDT      SpO2                      99 %    8/11/2020 18:00 CDT      SpO2                      100 %                             Oxygen Therapy            Room air    8/11/2020 17:30 CDT      SpO2                      100 %    8/11/2020 17:13 CDT      SpO2                      97 %    8/11/2020 17:00 CDT      Oxygen Therapy            Room air    8/11/2020 16:56 CDT      SpO2                      99 %                             Oxygen Therapy             Room air     Interventions: Order Profile (Selected)   Inpatient Orders  Ordered  Lactated Ringers 1000ml 1,000 mL: 1,000 mL, 1,000 mL, IV, 999 mL/hr, start date 08/11/20 18:43:00 CDT, 1.93, m2  Lactated Ringers 1000ml 2,000 mL: 2,000 mL, 2,000 mL, IV, 1,000 mL/hr, start date 08/11/20 17:06:00 CDT, 1.93, m2  Magnesium Sulfate 1gm/100ml IVPB (Premix): 2 gm, form: Infusion, IV Piggyback, Once, Infuse over: 1 hr, first dose 08/11/20 18:43:00 CDT, stop date 08/11/20 18:43:00 CDT, STAT  potassium chloride 10 mEq/100 mL intravenous solution: 20 mEq, form: Infusion, IV Piggyback, Once, Infuse over: 2 hr, first dose 08/11/20 18:42:00 CDT, stop date 08/11/20 18:42:00 CDT, STAT  potassium chloride 20 mEq oral powder for reconstitution: 40 mEq, form: Powder-Recon, Oral, Once, first dose 08/11/20 18:41:00 CDT, stop date 08/11/20 18:41:00 CDT, STAT  Completed  potassium chloride: 20 mEq, 200 mL, Soln, N/A, Once, Stop date 08/11/20 19:03:41 CDT, Physician Stop, 08/11/20 19:03:41 CDT.   Notes: BP improving after fluid boluses.  Concern for electrolyte derangement, will seek inpatient admission..      Procedure   Critical care note   Total time: 30 minutes spent engaged in work directly related to patient care and/ or available for direct patient care.   Critical condition(s) addressed for impending deterioration include: metabolic, renal.   Associated risk factors: hypotension, metabolic changes.   Management: bedside assessment, Interpretation (chest x-ray, electrocardiogram, blood pressure, cardiac output measures), Interventions hemodynamic management, Case review medical specialist.   Performed by: self.   Notes      Impression and Plan   Diagnosis   Orthostatic hypotension (GCT58-VX I95.1)   Acute hypokalemia (WAJ10-HZ E87.6)   JORJE (acute kidney injury) (ATB04-AL N17.9)      Calls-Consults   -  8/11/2020 19:07:00 , phone call, consult, recommends Internal medicine on call discussed the case, they will come to evaluate  the patient, likely admission for electrolyte repletion on telemetry..    Plan   Condition: Stable.    Disposition: Admit time  8/11/2020 19:07:00, Admit to Inpatient Telemetry Unit.    Counseled: Patient, Regarding diagnosis, Regarding diagnostic results, Regarding treatment plan, Regarding prescription, Patient indicated understanding of instructions.    Notes: Spoke with dr Bojorquez who assisted in the MDM of this case.       Addendum      Teaching-Supervisory Addendum-Brief   I participated in the following activities of this patients care: the medical history, the physical exam, medical decision making.   I personally performed: supervision of the patient's care, the medical history, the physical exam.   The case was discussed with: the physician assistant.   Evaluation and management service: I agree with the evaluation and management decisions made in this patient's care.   Results interpretation: I agree with the study interpretation in this patient's care.   Notes: I have seen this patient and performed a face-to-face independent history and physical examination, and agree with all evaluation and management as per Abraham CANALES.  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  Unclear why his degree of hypokalemia and creatinine elevation are as high as they are, exam is nonspecific.  Consult internal medicine for hydration, admission, potassium supplementation, and medication adjustment.    Jorge Bojorquez MD    .

## 2022-04-30 NOTE — ED PROVIDER NOTES
Patient:   Huey Pinzon            MRN: [id number]            FIN: [id number]               Age:   38 years     Sex:  Male     :  1980   Associated Diagnoses:   Orthostatic hypotension; JORJE (acute kidney injury); Dehydration; Orthostatic hypotension   Author:   Sheron Vega      Basic Information   Time seen: Date & time 10/18/2018 17:28:00, Immediately upon arrival.   History source: Patient.   Arrival mode: Private vehicle.   History limitation: None.   Additional information: Chief Complaint from Nursing Triage Note : Chief Complaint   10/18/2018 17:15 CDT     Chief Complaint           vomiting 2 days ago states went back to work , weakness/ dizziness , chest tightness and tunnel vision today . Hypotensive in triage tachycardic  .      History of Present Illness   The patient presents with weakness.  The onset was Today.  The course/duration of symptoms is constant.  The character of symptoms is generalized.  The degree at present is minimal.  Risk factors consist of hypertension.  Prior episodes: none.  Therapy today: see nurses notes.  Associated symptoms: denies chest pain, denies abdominal pain, denies nausea, denies vomiting, denies shortness of breath, denies fever, denies chills, denies headache, denies dizziness and denies back pain.  Additional history:     Patient presents today c/o generalized weakness that started a few hours ago while he was at work. Patient states when it initially started he had tunnel vision and an episode of chest tightness. Those symptoms have resolved and now he just feels weak as if he will pass out. He was seen in ED 4 days ago for nausea/vomiting and diarrhea. Patient states he hasn't vomited in 3 days, but is still having occasional diarrhea. He has been trying to drink a lot of fluids. He has a history of hypertension and reports taking Lisinopril about 5-6 hours ago before work. He did not check his blood pressure before doing so. Denies pain,  fever, chills, abd/pelvic pain, nausea/vomiting, dizziness, headache, vision changes at present time, syncope, palpitations, chest pain, sob. .        Review of Systems   Constitutional symptoms:  Weakness, no fever, no chills.    Skin symptoms:  Negative except as documented in HPI.   Eye symptoms:  Vision unchanged.   ENMT symptoms:  No sore throat,    Respiratory symptoms:  No shortness of breath, no cough.    Cardiovascular symptoms:  No chest pain, no palpitations, no syncope, no diaphoresis, no peripheral edema.    Gastrointestinal symptoms:  Diarrhea, no abdominal pain, no nausea, no vomiting, no constipation.    Genitourinary symptoms:  Negative except as documented in HPI.   Musculoskeletal symptoms:  Negative except as documented in HPI.   Neurologic symptoms:  No headache, no dizziness.              Additional review of systems information: All other systems reviewed and otherwise negative.      Health Status   Allergies:    Allergic Reactions (Selected)  No Known Allergies,    Allergies (1) Active Reaction  No Known Allergies None Documented.   Medications:  (Selected)   Prescriptions  Prescribed  Bentyl 10 mg oral capsule: 20 mg = 2 cap(s), Oral, QID, # 20 cap(s), 0 Refill(s)  Protonix 40 mg ORAL enteric coated tablet: 40 mg = 1 tab(s), Oral, Daily, # 90 tab(s), 2 Refill(s)  Zofran ODT 8 mg oral tablet, disintegratin mg = 1 tab(s), Oral, q8hr, PRN PRN nausea/vomiting, allow tablet to dissolve on tongue, # 12 tab(s), 0 Refill(s)  lisinopril 20 mg oral tablet: 20 mg = 1 tab(s), Oral, Daily, # 90 tab(s), 3 Refill(s), per nurse's notes.   Immunizations: Per nurse's notes.      Past Medical/ Family/ Social History   Medical history:    Resolved  Hypertension ([id number]):  Resolved., Reviewed as documented in chart.   Surgical history:    Biopsy Gastrointestinal on 2018 at 37 Years.  Comments:  2018 16:49 - Jani FRIED, Katie TATUM  auto-populated from documented surgical  case  Esophagogastroduodenoscopy on 7/18/2018 at 37 Years.  Comments:  7/18/2018 16:49 - Katie Gunter RN  auto-populated from documented surgical case  Esophagogastroduodenoscopy on 6/15/2016 at 35 Years.  Comments:  6/15/2016 16:55 - Katie Gunter RN  auto-populated from documented surgical case  inguinal hernia repair in the month of 3/1982 at 7 Months., Reviewed as documented in chart.   Family history:    Hypertension.  Mother  Father  Hyperlipidemia.  Mother: onset at 45 .  , Reviewed as documented in chart.   Social history: Reviewed as documented in chart, Alcohol use: Denies, Tobacco use: Denies, Drug use: Denies, Occupation: Unemployed, Family/social situation: Intact family.      Physical Examination               Vital Signs             Time:  10/18/2018 17:29:00.   Vital Signs   10/18/2018 17:15 CDT     Temperature Oral          36.5 DegC                             Temperature Oral (calculated)             97.70 DegF                             Peripheral Pulse Rate     115 bpm  HI                             Respiratory Rate          18 br/min                             SpO2                      95 %                             Oxygen Therapy            Room air                             Systolic Blood Pressure   98 mmHg                             Diastolic Blood Pressure  64 mmHg  .      Vital Signs (last 24 hrs)_____  Last Charted___________  Temp Oral     36.5 DegC  (OCT 18 17:15)  Heart Rate Peripheral   H 115bpm  (OCT 18 17:15)  Resp Rate         18 br/min  (OCT 18 17:15)  SBP      98 mmHg  (OCT 18 17:15)  DBP      64 mmHg  (OCT 18 17:15)  SpO2      95 %  (OCT 18 17:15)  Weight      86.1 kg  (OCT 18 17:15).   Measurements   10/18/2018 17:15 CDT     Weight Dosing             86.1 kg                             Weight Measured           86.1 kg                             Weight Measured and Calculated in Lbs     189.82 lb                             Weight Estimated          86.1  kg  .   Basic Oxygen Information   10/18/2018 17:15 CDT     SpO2                      95 %                             Oxygen Therapy            Room air  .   General:  Alert, no acute distress.    Skin:  Warm, dry, intact, normal for ethnicity.    Head:  Normocephalic, atraumatic.    Neck:  Supple, trachea midline, no JVD.    Eye:  Pupils are equal, round and reactive to light, extraocular movements are intact, normal conjunctiva.    Ears, nose, mouth and throat:  Tympanic membranes clear, no pharyngeal erythema or exudate, Dry mucous membranes.    Cardiovascular:  Regular rate and rhythm, No murmur, Normal peripheral perfusion, No edema.    Respiratory:  Lungs are clear to auscultation, respirations are non-labored, breath sounds are equal, Symmetrical chest wall expansion.    Gastrointestinal:  Soft, Nontender, Non distended, Normal bowel sounds.    Back:  Normal range of motion, Normal alignment.    Neurological:  Alert and oriented to person, place, time, and situation, No focal neurological deficit observed, CN II-XII intact, normal sensory observed, normal motor observed, normal speech observed, normal coordination observed.    Lymphatics:  No lymphadenopathy.      Medical Decision Making   Differential Diagnosis:  Weakness, dizziness, hypotension, dehydration, hypoglycemia.    Documents reviewed:  Emergency department nurses' notes, emergency department records, prior records.    Electrocardiogram:  Time 10/18/2018 17:17:00, rate 104, No ST-T changes, no ectopy, normal DE & QRS intervals, EP Interp, The Rhythm is sinus tachycardia.  , The Axis is normal.  .    Results review:  Lab results : Lab View   10/18/2018 17:50 CDT     Sodium Lvl                130 mmol/L  LOW                             Potassium Lvl             3.0 mmol/L  LOW                             Chloride                  86 mmol/L  LOW                             CO2                       36 mmol/L  HI                             Calcium  Lvl               9.1 mg/dL                             Magnesium Lvl             3.0 mg/dL  HI                             Glucose Lvl               113 mg/dL  HI                             BUN                       49 mg/dL  HI                             Creatinine                3.30 mg/dL  HI                             eGFR-AA                   27 mL/min  LOW                             eGFR-VEL                  23 mL/min  LOW                             Bili Total                0.4 mg/dL                             Bili Direct               0.1 mg/dL                             Bili Indirect             0.3 mg/dL                             AST                       17 unit/L                             ALT                       30 unit/L                             Alk Phos                  82 unit/L                             Total Protein             8.7 gm/dL  HI                             Albumin Lvl               4.1 gm/dL                             Globulin                  4.60 gm/mL  HI                             A/G Ratio                 1 ratio                             WBC                       15.5 x10(3)/mcL  HI                             RBC                       5.26 x10(6)/mcL                             Hgb                       14.4 gm/dL                             Hct                       43.3 %                             Platelet                  453 x10(3)/mcL  HI                             MCV                       82.3 fL                             MCH                       27.4 pg                             MCHC                      33.3 gm/dL                             RDW                       13.6 %                             MPV                       10.8 fL  HI                             Abs Neut                  10.16 x10(3)/mcL  HI                             Neutro Auto               66 x10(3)/mcL  NA                             Lymph Auto                24 %                              Mono Auto                 10 %                             Eos Auto                  0  NA                             Abs Eos                   0.06 x10(3)/mcL  NA                             Basophil Auto             0  NA                             Abs Neutro                10.16 x10(3)/mcL  NA                             Abs Lymph                 3.66 x10(3)/mcL  NA                             Abs Mono                  1.50 x10(3)/mcL  NA                             Abs Baso                  0.04 x10(3)/mcL  NA                             IG%                       0 %  NA                             IG#                       0.0500  NA  ,    No qualifying data available.       Reexamination/ Reevaluation   Time: 10/18/2018 18:39:00 .   Vital signs   Orthostatic Vital Signs   10/18/2018 18:12 CDT     Systolic Blood Pressure Supine            101 mmHg                             Diastolic Blood Pressure Supine           63 mmHg                             Pulse Supine              104 bpm                             Systolic Blood Pressure Sitting           87 mmHg                             Diastolic Blood Pressure Sitting          65 mmHg                             Pulse Sitting             112 bpm                             Systolic Blood Pressure Standing          81 mmHg                             Diastolic Blood Pressure Standing         63 mmHg                             Pulse Standing            117 bpm     Basic Oxygen Information   10/18/2018 17:15 CDT     SpO2                      95 %                             Oxygen Therapy            Room air     Course: well controlled.   Notes:     VSS, in NAD. Afebrile and non-toxic appearing. Resting comfortably in exam room with no complaints. Discussed dx and tx plan with patient. Medicine will be consulted for further eval/management of JORJE. Patient agrees with admission. .      Procedure   Critical care note   Total time:  30 minutes spent engaged in work directly related to patient care and/ or available for direct patient care.   Critical condition(s) addressed for impending deterioration include: cardiovascular.   Associated risk factors: hypotension, metabolic changes.   Management: bedside assessment, supervision of care, Interpretation (chest x-ray, electrocardiogram, blood pressure), Interventions hemodynamic management, Case review nursing.   Performed by: self.      Impression and Plan   Diagnosis   Orthostatic hypotension (KOX33-ZH I95.1)    Diagnosis code search    JORJE (acute kidney injury) (QKX94-BF N17.9)   Dehydration (ZHI66-PF E86.0)      Calls-Consults   -  10/18/2018 18:41:00 , Medicine, consult, recommends Will evaluate patient in ED. See consult note..    Plan   Condition: Improved, Stable.    Disposition: Admit time  10/18/2018 18:44:00, Place in Observation Unit, Dispositioned by: Time: 10/18/2018 18:44:00, Sheron Vega.    Counseled: Patient, Regarding diagnosis, Regarding diagnostic results, Regarding treatment plan, Patient indicated understanding of instructions.       Addendum      Teaching-Supervisory Addendum-Brief   I participated in the following activities of this patients care: the medical history, the physical exam, medical decision making, the procedure.   I personally performed: supervision of the patient's care, the medical history, the physical exam, the medical decision making.   The case was discussed with: the physician assistant.   Procedures: I was present for key portions of the procedure and was immediately available for the non-key portions.   Evaluation and management service: I agree with the evaluation and management decisions made in this patient's care.   Results interpretation: I agree with the documentation of the study interpretation.   Notes: I, JAIME YehP, performed a face to face evaluation of this Pt Huey Pinzon and my history reveal Hx of HTN, working on  heat exposure for 4-5 days feeling dizzy and with near syncope. This case was initially evaluated by RINA PRESTON)  under my supervision and I agree with her documentation, procedures and plan. Pt with JORJE and signs of hypovolemic shock requiring inmediate fluids to improve perfussion. I personally performed the history and key portions of the critical care  procedure for this patient. .

## 2022-04-30 NOTE — DISCHARGE SUMMARY
Patient:   Huey Pinzon            MRN: [id number]            FIN: [id number]               Age:   38 years     Sex:  Male     :  1980   Associated Diagnoses:   None   Author:   Noam Diaz DO      Basic Information   Source of history:  Self.    Referral source:  Emergency department.    History limitation:  None.       Chief Complaint      History of Present Illness   Date of admit: 10/18/18  Date of discharge: 10/20/18  Initial diagnoses: Infectious diarrhea, JORJE, Hyponatremia, Hypokalemia  Final diagnoses: Viral Gastroenteritis, JORJE  Procedures:None  Consults:None     Hospital course: 38-year-old male with past medical history of hypertension presented to the ED on 10/18/2018 complaining of generalized weakness with associated nausea, vomiting, and diarrhea for the past 4 days.  He stated his coworkers had similar symptoms.  Denies any recent travel, recent hospitalization, new medication, or new exposures to food.  Denied fevers, syncope, chest pain, shortness of breath, abdominal pain, hematochezia, hematuria, and numbness and tingling.  In the ED, Labs were remarkable for leukocytosis, hyponatremia, hypokalemia, and JORJE with BUN/creatinine of 49/3.3. Throughout admission, no acute events and patient was stable.  Patient received fluids, electrolytes replacement, Rocephin and Flagyl, and symptomatic management.  Chest x-ray showed no acute cardiopulmonary processes.  Stool cultures were negative, C. difficile was negative, and fecal leukocytes were negative.  Today, leukocytosis resolved, patient has been afebrile with vitals within normal limits, frequency of diarrhea has decreased, and BUN and creatinine has returned to baseline.  Patient denies any fevers, headaches, chest pain, abdominal pain, nausea, vomiting, hematochezia, hematemesis, or any weaknesses today.  Patient admits feeling great and is ready to be discharged.  Patient will be discharged home today in stable  condition.  ED precautions given.  Patient was instructed to follow-up with PCP within 2-4 weeks or sooner if he develops worsening symptoms.  Patient verbalized understanding and has no other concerns at this time.      Review of Systems   Constitutional:  No fever, No chills, No sweats, No weakness.    Eye:  No blurring, No double vision, No visual disturbances.    Respiratory:  No shortness of breath, No cough, No sputum production, No hemoptysis, No wheezing.    Cardiovascular:  No chest pain, No palpitations, No peripheral edema, No syncope.    Gastrointestinal:  +admits loose stools this morning but no blood, No nausea, No vomiting, No abdominal pain.    Genitourinary:  No dysuria, No hematuria.    Musculoskeletal:  Negative.    Integumentary:  Negative.    Neurologic:  Alert and oriented X4, No confusion, No numbness, No tingling, No headache.       Health Status   Allergies:    Allergic Reactions (All)  No Known Allergies      Physical Examination   General:  Alert and oriented, No acute distress.    Eye:  Pupils are equal, round and reactive to light, Extraocular movements are intact.    HENT:  Normocephalic.    Respiratory:  Lungs are clear to auscultation, Respirations are non-labored, Symmetrical chest wall expansion, No chest wall tenderness.    Cardiovascular:  Normal rate, Regular rhythm, No murmur, No gallop.    Gastrointestinal:  Soft, Non-tender, Non-distended.    Genitourinary:  No costovertebral angle tenderness.    Musculoskeletal:  Normal strength, No tenderness, No swelling, No deformity, Normal gait.    Integumentary:  Warm, Dry.    Neurologic:  Alert, Oriented, No focal deficits.    Psychiatric:  Cooperative.       Impression and Plan     JORJE secondary to gastroenteritis   -On admission, BUN/Cr  was 49/3.30  -JORJE resolved. BUN/Cr is 19/1.10 today  -Electrolytes WNL.   -Patient tolerates oral intake.  -Encourage continuing hydration and good oral intake    Gastroenteritis  -Diarrhea frequency  has resolved  -Encouraged good hydration and continue oral intake.  -Advance diet as tolerated  -Continue Flagyl 500mg BID x 3 days  -ED precautions given    Discharge location: Home  Condition Upon Dischagre: Stable  Activity: Resume baseline level activity as tolerated  Follow up: ED precautions provided,   Medications upon Discharge: Continue home medications and Flagyl 500mg BID for three days  Issues to be adressed at follow-up: Improvement of symptoms, medications compliance

## 2022-04-30 NOTE — OP NOTE
DATE OF SURGERY:    07/18/2018    SURGEON:  Burke Mas MD    attending physician:  Burke Mas MD    PROCEDURE PERFORMED:  EGD with biopsy.    INDICATION:  Acid reflux    ANESTHESIA:  Monitored anesthesia care.    ESTIMATED BLOOD LOSS:  Minimal.    PROCEDURE IN DETAIL:  After consent was obtained, patient was brought to the procedure room, placed in left lateral position after bite block was placed and adequate sedation achieved.  Scope was inserted through the oropharynx into the esophagus.  At the GE junction we encountered 2 tongues of salmon-colored mucosa, which were biopsied to rule out Khan's esophagus.  We also encountered a 2 cm hiatal hernia.  The scope was advanced into the stomach.  After adequate insufflation, retroflexion was performed and the hiatal hernia was noted again.  The scope was then straightened and advanced to the pylorus, which was noted to be normal, and then advanced into the duodenum down to D3, which was all noted to be normal.  The scope was then withdrawn from the patient and was brought back to recovery in stable condition.    IMPRESSION:    1. 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  2. 2 cm hiatal hernia.    PLAN:    1. If biopsies are positive for Khan esophagus, he will need a repeat EGD in 1 year.  2. Continue current reflux regimen, which is giving him adequate control over his acid reflux.  If this     med is no longer working in the future, he can consider repair of the hiatal hernia appreciated by esophageal motility study.        ______________________________  MD YANETH Gilmore/SHIRAL  DD:  08/02/2018  Time:  04:32PM  DT:  08/02/2018  Time:  04:52PM  Job #:  [id number]

## 2022-04-30 NOTE — H&P
Patient:   Huey Pinzon            MRN: [id number]            FIN: [id number]               Age:   39 years     Sex:  Male     :  1980   Associated Diagnoses:   None   Author:   Dominique Ding MD      Basic Information   Source of history:  Self, Medical record.    Referral source:  Emergency department.    History limitation:  None.       Chief Complaint       2020 18:55 CDT      Pt c/o n/v/d, he states he has been seen here 2 x since  with same c/o.  When he went home today and he had blood in his vomit.    2020 8:24 CDT       PT WEARING MASK W CO NV X 3 DAYS.  HX OF CANNABIS HYPEREMESIS AND HAS BEEN SMOKING IT DUE TO RECENT STRESS.        History of Present Illness   Mr. Pinzon is a 39-year-old  male with PMH of cannabinoid hyperemesis syndrome, HTN, and GERD presents again to the ED because he had blood streaked vomit x1.  Patient has ongoing nausea and vomiting since 3 days ago.  Patient last smoke marijuana  night, 3 nights ago.  Patient understand that his cannabis abuse is the reason for his nausea and vomiting, but he cannot quit.  Patient reports hot shower and topical capsaicin does help relieve his symptoms.  However the topical burns after a while.  Also Thorazine works really well for his nausea and vomiting.  Also he had a few episodes of nonbloody diarrhea a day ago with mild abdominal cramping.    FH: Mother: HLD, HTN; Father: HTN  PSH/Procedures: EGD in 2016 was concerning for Khan's esophagus, repeat EGD in 2018 and 2019 was non-concerning according to patient  Social history: Smoke marijuana on a regular basis, drink 2-3 beers on the weekend, sometimes do mushrooms, have not done LSD in a while, denies smoking cigarettes, denies vaping, works as a   Allergies: NKDA    In the ED, tachycardic (107) hypertensive (140s-160s/90s-100s), otherwise afebrile and satting 99% on room air.  CBC notable for leukocytosis of 16.4 an increased from 11.2 from 10  hours ago.  UDS positive for cannabis. CMP and UA unremarkable.  COVID negative.  CT abdomen and pelvis with contrast shows circumferential thickening of the distal esophageal wall and prominence of the gastric folds.  Patient was given Thorazine and 500 mL bolus of LR.this a.m. patient was given clonidine, lisinopril, Phenergan, Zofran and 1 L bolus of LR.  IM was consulted for intractable nausea and vomiting secondary to cannabinoid hyperemesis syndrome, hematemesis, leukocytosis, and acute gastritis.      Review of Systems   Constitutional:  No fever, No chills.    Eye:  No visual disturbances.    Respiratory:  No shortness of breath, No cough.    Cardiovascular:  No chest pain.    Gastrointestinal:  Nausea, Vomiting, Hematemesis.    Genitourinary:  No dysuria.    ROS reviewed as documented in chart      Health Status   Current medications:    Home Medications (5) Active  capsaicin 0.025% topical cream 1 wendy, PRN, TOP, Daily  capsaicin 0.025% topical cream 1 wendy, PRN, TOP, Daily  lisinopril 20 mg oral tablet 20 mg = 1 tab(s), Oral, Daily  Protonix 40 mg ORAL enteric coated tablet 40 mg = 1 tab(s), Oral, Daily  Zofran ODT 4 mg oral tablet, disintegrating 4 mg = 1 tab(s), PRN, Oral, TID        Physical Examination      Vital Signs (last 24 hrs)_____  Last Charted___________  Temp Oral     37.8 DegC  (JUN 23 23:34)  Heart Rate Peripheral   95 bpm  (JUN 23 23:34)  Resp Rate         18 br/min  (JUN 23 23:34)  SBP      H 157mmHg  (JUN 23 23:34)  DBP      H 104mmHg  (JUN 23 23:34)  SpO2      100 %  (JUN 23 23:34)  Weight      76.3 kg  (JUN 23 23:55)  Height      159 cm  (JUN 23 23:55)  BMI      30.18  (JUN 23 23:55)     General:  No acute distress.    Eye:  Normal conjunctiva.    HENT:  Oral mucosa is moist, No pharyngeal erythema.    Respiratory:  Lungs are clear to auscultation, Respirations are non-labored, Breath sounds are equal, Symmetrical chest wall expansion, No chest wall tenderness.    Cardiovascular:  Normal  rate, Regular rhythm, No murmur, No gallop, Good pulses equal in all extremities, Normal peripheral perfusion, No edema.    Gastrointestinal:  Soft, Non-tender, Non-distended, Normal bowel sounds.    Musculoskeletal:  No deformity.    Integumentary:  Warm, Dry.    Neurologic:  Alert, Oriented, No focal deficits.    Cognition and Speech:  Speech clear and coherent.    Psychiatric:  Cooperative.       Review / Management   Labs Last 24 Hours   Chemistry  Hematology/Coagulation    U pH: 6 (06/23/20 22:21:00) WBC: 16.4 x10(3)/mcL High (06/23/20 19:23:00)    RBC: 5.22 x10(6)/mcL (06/23/20 19:23:00)    Hgb: 12.4 gm/dL Low (06/23/20 19:23:00)    Hct: 41 % (06/23/20 19:23:00)    Platelet: 369 x10(3)/mcL (06/23/20 19:23:00)    MCV: 78.5 fL Low (06/23/20 19:23:00)    MCH: 23.8 pg Low (06/23/20 19:23:00)    MCHC: 30.2 gm/dL Low (06/23/20 19:23:00)    RDW: 15.9 % High (06/23/20 19:23:00)    MPV: 10.1 fL (06/23/20 19:23:00)    Abs Neut: 12.81 x10(3)/mcL High (06/23/20 19:23:00)    Neutro Auto: 78 % (06/23/20 19:23:00)    Lymph Auto: 13 % (06/23/20 19:23:00)    Mono Auto: 8 % (06/23/20 19:23:00)    Basophil Auto: 0 % (06/23/20 19:23:00)    Abs Neutro: 12.81 x10(3)/mcL High (06/23/20 19:23:00)    Abs Lymph: 2.2 x10(3)/mcL (06/23/20 19:23:00)    Abs Mono: 1.3 x10(3)/mcL (06/23/20 19:23:00)    Abs Baso: 0 x10(3)/mcL (06/23/20 19:23:00)    IG%: 0 % (06/23/20 19:23:00)    IG#: 0.06 (06/23/20 19:23:00)            Radiology results   Rad Results (ST)   Accession: [id number]  Order: CT Abdomen and Pelvis W Contrast  Report Dt/Tm: 06/23/2020 21:04  Report:   EXAMINATION  CT Abdomen and Pelvis W Contrast     TECHNIQUE       Helical-acquisition CT images were obtained following the  intravenous administration of iodinated contrast media. Enteric  contrast was not utilized.       Multiplanar reformats were accomplished by a CT technologist at a  separate workstation and pushed to PACS for physician review.     Total DLP (mGy-cm):  1037.6  (value may include radiation due to concomitantly performed CT  imaging)       Automated tube current modulation and/or weight-based exposure  dosing is utilized, when appropriate, to reach lowest reasonably  achievable exposure rate.     INDICATION  Nausea, vomiting, diarrhea     Comparison: 4 May, 2020     FINDINGS  Images were reviewed in soft tissue, lung, and bone windows.     Exam quality: adequate     Lines/tubes: None visualized.     Lower thoracic cavity       Heart: No acute or focal abnormality.       Lung bases: No acute or focal abnormality.     Hepatobiliary       Liver: No acute or suspicious focal abnormality. Small cystic  appearing irregularity at the inferior margin of hepatic segment 5 is  unchanged.       Gallbladder: No calcified stone, wall thickening, or  pericholecystic inflammation.       Bile ducts: No convincing obstructive process.     Pancreas: No acute or focal abnormality.     Spleen: No acute or focal abnormality.     Adrenal glands: No acute or focal abnormality.     Genitourinary       Kidneys/ureters: Left pelvic kidney is again noted. No new focal  renal lesion is identified. There is no complex cyst. No urolithiasis  or evidence of obstructive uropathy.       Urinary bladder: No focal wall thickening or convincing  intraluminal abnormality.       Prostate: Unremarkable for CT assessment.     Gastrointestinal       Esophagus: Circumferential mural thickening is appreciated at the  visualized distal colon, without focal asymmetry or other suspicious  discrete lesion.       Stomach: There is relative prominent appearance of the rugal  folds, without focal intraluminal abnormality.       Small bowel: No focal abnormality or suggestion of high-grade  obstruction.       Appendix: No acute or focal abnormality.       Large bowel: No acute or focal abnormality.     Peritoneal cavity/Retroperitoneum: No free fluid or air.     Lymph nodes: No pathologic enlargement or necrotic  process.     Vasculature: No acute or focal abnormality.     Musculoskeletal       Subcutaneous/musculature: Uncomplicated fat-containing umbilical  and bilateral inguinal hernias are similar in the interval. No new  focal body wall abnormality is identified.       Osseous: There are degenerative changes of the spinal column and  bony pelvis. No acute osseous displacement or destructive focal lesion  is appreciated. Progressed healing changes of the nondisplaced right  9th rib fracture is incidentally mentioned.     IMPRESSION  1.  Circumferential thickening of the distal esophageal wall and  prominence of the gastric folds are nonspecific, but may reflect  sequela of esophagitis/gastritis.  2.  Otherwise, no evidence of acute or new focal abdominopelvic  process.  3.  Chronic secondary details discussed above.          Impression and Plan     Concern for Debbie-Flynn tear  Acute gastritis/esophagitis  Hematemesis with ongoing vomiting for the past 3 days  CT abdomen and pelvis with contrast shows circumferential thickening of the distal esophageal wall and prominence of the gastric folds.    CXR two-view pending  Consider GI consult for EGD    Cannabinoid hyperemesis syndrome  UDS positive for cannabis  PRN: Thorazine 25 mg every 4 hours  Case management consult placed for resources to help quit smoking marijuana    Chronic GERD  EGD 7/17/2019 with biopsy at GE junction that is consistent with GERD and no dysplasia identified  Continue Protonix 40 mg daily    HTN  Continue lisinopril 20 daily    DVT prophylaxis: SCDs  Diet: N.p.o.    Disposition: Admitted to medical unit for acute gastritis/esophagitis with concern for Debbie-Flynn tear.  Kept n.p.o. in case of possible intervention.  CXR 2 view pending. Consider GI consult for EGD.

## 2022-05-02 NOTE — HISTORICAL [facility]
This is a historical note converted from [place]. Formatting and pictures may have been removed.  Please reference [place] for original formatting and attached multimedia. Chief Complaint  PT WEARING MASK W CO BODY ACHES, COUGH, STOMACH CRAMPS W DIARRHEA X 2 DAYS. HR ELEVATED IN TRIAGE >130, ?EKG OBTAINED. PT DENIES RECENT DRUG USE.  History of Present Illness  Patient is a?40-year-old  male with PMH?of?cannabinoid hyperemesis syndrome,?marijuana abuse,?GERD, HTN,?and?obesity?presenting to [facility]?with complaints of?N/V,?diarrhea,?abdominal pain, and cough?for the past 3 days.? Patient?endorses?roughly 20 episodes of?nonbloody emesis?over the course of?3 days?and several?diarrheal episodes.? Last?emesis episode?was around?8 AM?this morning?and stated that it appeared blood-tinged. ?Denies?noticing?blood in stool.? Endorses fever/chills, muscle aches, nonproductive cough. ?Denies?chest pain, SOB,?difficulty swallowing, pain with swallowing.?Patient states that he has not been able to eat much but?has been trying to?drink fluids?for 3 days?however he frequently vomits?after ingesting?liquids.? Patient adamantly denies?recent?cannabis use?that he last smoked?marijuana?several weeks ago. ?However,?upon interviewing it?was noted that the patient?frequently changed?the story of his stop date?denies tobacco?use, illicit drug use,?and?EtOH use.? Patient endorses increased?urinary frequency over the past few days?but denies dysuria, hematuria, urgency. On admission patient was found to be afebrile, tachycardic, normotensive?with a?creatinine?of 4.72, multiple electrolyte derangements, and leukocytosis. ?He was given Zofran, Bentyl 20 mg IV, received 2L LR in 1L NC with KCl 40 mEq PO in ED with relief of n/v and abdominal pain. Medicine was consulted for management of JORJE.  ?   PSH: EGD in 2016, 2018, and 2019, Inguinal hernia repair  Fam Hx: HLD- mother, HTN- mother and father  Social Hx:?adamantly denies marijuana use,  vaping, tobacco abuse, and EtOH use; no recent sick contact exposure  Allergies: NKDA  Review of Systems  Constitutional: + fever, + fatigue, +?weakness  Eye:?no vision loss, eye redness, drainage, or pain  ENMT:?no sore throat, sinus pain/congestion, nasal congestion/drainage  Respiratory:?no cough, no wheezing, no shortness of breath  Cardiovascular:?no chest pain, no palpitations, no edema  Gastrointestinal:?+ nausea, + vomiting,?+ diarrhea. + abdominal pain  Genitourinary:?no dysuria, + urinary frequency, no urgency, no hematuria  Musculoskeletal:?no muscle or joint pain, no joint swelling  Integumentary:?no skin rash or abnormal lesion  Neurologic: no headache, no dizziness, no weakness or numbness  Physical Exam  Vitals & Measurements  T:?36.6? ?C (Oral)? HR:?108(Peripheral)? RR:?22? BP:?123/103? SpO2:?100%? WT:?88?kg? BMI:?34.38?  General:?obese male?in no acute distress  Eye: PERRLA, EOMI, clear conjunctiva, eyelids normal  Respiratory:?clear to auscultation bilaterally  Cardiovascular:?regular rate and rhythm without murmurs, gallops or rubs  Gastrointestinal:?soft,?mildly tender in middle-left lower quadrant,?non-distended with normal bowel sounds, without masses to palpation  Genitourinary: no CVA tenderness to palpation B/L  Musculoskeletal:?full range of motion of all extremities/spine without limitation or discomfort  Integumentary: no rashes or skin lesions present  Neurologic: cranial nerves intact, no signs of peripheral neurological deficit, motor/sensory function intact  Assessment/Plan  Cannabis hyperemesis syndrome with concurrent cannabis abuse  -UDS+ for cannabis and opioids, despite patients persistent and adamant denial of cannabis use  -Lipase, LFT, troponin negative  -given Zofran 4 mg prn and Bentyl with relief of symptoms  ?  JORJE on CKD  -BUN 57.0, creatinine 4.72 on admission, baseline appears to be around 1.50  -GFR 15  -Patient denies recent NSAID use and is aware that he has been  advised not to take them  -UA revealed 20 protein  -Urine protein/creatinine ratio pending  -Retroperitoneal U/S ordered to r/o hydronephrosis given significant increase in serum creatinine  ?  Hyponatremia, hypokalemia, hypochloremia, hypercalcemia, hyperphosphatemia  -received KCl 40 mEq PO in ED  -received 2L LR and 1L NS  -will recheck electrolytes and continue to monitor  ?   Leukocytosis  -WBC 19.6, afebrile  -CXR revealed no abnormalities or consolidations indicative of infection  -high suspicion for hemoconcentration given no source of infection at this time, have ordered repeat at 6 PM after repleting with IVF  ?  Reactive thrombocytosis  -Likely secondary to hemoconcentration  -Platelets 607 on admission  ?  GERD  -Continue Protonix?40 mg IV?daily  ?  HTN  -currently well controlled, holding home HCTZ-Lisinopril given patients electrolyte derangements and JORJE?  -Labetalol 10mg prn, will consider adding Norvasc if BP starts to increase  ?  DVT PPx: Heparin 5000u BID  GI PPx: Protonix 40mg IV qd  Abx: none  Fluids: 2L LR, 1L NS  O2: RA  Code Status: Full  Nutrition: CLD  PCP: Dr. Kohli   Problem List/Past Medical History  Ongoing  Cannabis hyperemesis syndrome co-occurrent and due to cannabis abuse  GERD - Gastro-esophageal reflux disease  Hypertension  Obesity  Vomiting  Historical  Hypertension  Procedure/Surgical History  Biopsy Gastrointestinal (07/17/2019)  Esophagogastroduodenoscopy (07/17/2019)  Esophagogastroduodenoscopy, flexible, transoral; with biopsy, single or multiple (07/17/2019)  Excision of Esophagus, Via Natural or Artificial Opening Endoscopic, Diagnostic (07/17/2019)  Biopsy Gastrointestinal (07/18/2018)  Esophagogastroduodenoscopy (07/18/2018)  Esophagogastroduodenoscopy, flexible, transoral; with biopsy, single or multiple (07/18/2018)  Excision of Esophagogastric Junction, Via Natural or Artificial Opening Endoscopic (07/18/2018)  Esophagogastroduodenoscopy (06/15/2016)  Inspection  of Upper Intestinal Tract, Via Natural or Artificial Opening Endoscopic (06/15/2016)  inguinal hernia repair (03.1982)   Medications  Inpatient  Bentyl 10 mg/mL injectable solution, 20 mg= 2 mL, IM, Once-Unscheduled  Compazine, 10 mg= 2 mL, IV Push, Once  heparin, 5000 units= 1 mL, Subcutaneous, q12hr  IVF Normal Saline NS Infusion 1,000 mL, 1000 mL, IV  labetalol, 20 mg= 4 mL, IV Push, q2hr, PRN  Lactated Ringers 1000ml 1,000 mL, 1000 mL, IV  Lactated Ringers 1000ml 1,000 mL, 1000 mL, IV  NS (0.9% Sodium Chloride) Infusion 1,000 mL, 1000 mL, IV, Once-NOW  Protonix, 40 mg= 1 EA, IV Slow, Daily  Zofran, 4 mg= 2 mL, IV Push, q4hr, PRN  Home  ferrous gluconate 324 mg (38 mg elemental iron) oral tablet, 324 mg= 1 tab(s), Oral, Daily, 1 refills  hydrochlorothiazide-lisinopril 12.5 mg-20 mg oral tablet, 1 tab(s), Oral, Daily, 3 refills  Protonix 40 mg ORAL enteric coated tablet, 40 mg= 1 tab(s), Oral, Daily, 2 refills  Allergies  No Known Allergies  Social History  Abuse/Neglect  No, No, Yes, 12/27/2020  No, 10/28/2020  Alcohol  Current, Beer, Liquor, 1-2 times per week, 11/05/2020  Employment/School  Employed, 02/20/2020  Exercise  Exercise duration: 0., 02/20/2020  Home/Environment  Lives with Father, Mother. Living situation: Home/Independent., 02/20/2020  Nutrition/Health  Regular, Good, 12/21/2020  Sexual  Gender Identity Identifies as male., 12/21/2020  Substance Use  Current, Marijuana, 12/21/2020  Current, Marijuana, Daily, 11/05/2020  Tobacco  Former smoker, quit more than 30 days ago, No, 12/27/2020  Former smoker, quit more than 30 days ago, Cigarettes, N/A, 10/10/2020  Family History  Hyperlipidemia.: Mother (Dx at 45).  Hypertension.: Mother and Father.  Immunizations  Vaccine Date Status   influenza virus vaccine, inactivated 09/29/2020 Given   influenza virus vaccine, inactivated 02/20/2020 Given   influenza virus vaccine, inactivated 10/20/2018 Given   tetanus/diphtheria/pertussis, acel(Tdap) 05/17/2018  Given   influenza virus vaccine, inactivated 11/30/2016 Given   Lab Results  Labs Last 24 Hours?  ?Chemistry? Hematology/Coagulation?   Sodium Lvl:?130 mmol/L?Low (12/27/20 09:44:00) WBC:?19.6 x10(3)/mcL?High (12/27/20 09:44:00)   Potassium Lvl:?3.4 mmol/L?Low (12/27/20 09:44:00) RBC: 5.49 x10(6)/mcL (12/27/20 09:44:00)   Chloride:?74 mmol/L?Critical (12/27/20 09:44:00) Hgb: 14.5 gm/dL (12/27/20 09:44:00)   CO2: 28 mmol/L (12/27/20 09:44:00) Hct: 43 % (12/27/20 09:44:00)   Calcium Lvl:?11 mg/dL?High (12/27/20 09:44:00) Platelet:?607 x10(3)/mcL?High (12/27/20 09:44:00)   Magnesium Lvl: 1.64 mg/dL (12/27/20 09:44:00) MCV:?78.3 fL?Low (12/27/20 09:44:00)   Glucose Lvl:?184 mg/dL?High (12/27/20 09:44:00) MCH: 26.4 pg (12/27/20 09:44:00)   BUN:?57 mg/dL?High (12/27/20 09:44:00) MCHC: 33.7 gm/dL (12/27/20 09:44:00)   Creatinine:?4.72 mg/dL?High (12/27/20 09:44:00) RDW: 13.8 % (12/27/20 09:44:00)   Est Creat Clearance Ser: 16.91 mL/min (12/27/20 10:08:04) MPV:?10.5 fL?High (12/27/20 09:44:00)   eGFR-AA:?18?Low (12/27/20 09:44:00) Abs Neut:?16.1 x10(3)/mcL?High (12/27/20 09:44:00)   eGFR-VEL:?15 mL/min/1.73 m2?Low (12/27/20 09:44:00) Neutro Auto: 82 % (12/27/20 09:44:00)   Bili Total: 0.6 mg/dL (12/27/20 09:44:00) Lymph Auto: 11 % (12/27/20 09:44:00)   Bili Direct: 0.3 mg/dL (12/27/20 09:44:00) Mono Auto: 6 % (12/27/20 09:44:00)   Bili Indirect: 0.3 mg/dL (12/27/20 09:44:00) Eos Auto: 0 % (12/27/20 09:44:00)   AST: 27 unit/L (12/27/20 09:44:00) Abs Eos: 0 x10(3)/mcL (12/27/20 09:44:00)   ALT: 22 unit/L (12/27/20 09:44:00) Basophil Auto: 0 % (12/27/20 09:44:00)   Alk Phos: 98 unit/L (12/27/20 09:44:00) Abs Neutro:?16.1 x10(3)/mcL?High (12/27/20 09:44:00)   Total Protein:?10 gm/dL?High (12/27/20 09:44:00) Abs Lymph: 2.2 x10(3)/mcL (12/27/20 09:44:00)   Albumin Lvl: 4.9 gm/dL (12/27/20 09:44:00) Abs Mono: 1.2 x10(3)/mcL (12/27/20 09:44:00)   Globulin:?5.1 gm/dL?High (12/27/20 09:44:00) Abs Baso: 0 x10(3)/mcL (12/27/20  09:44:00)   A/G Ratio:?1 ratio?Low (12/27/20 09:44:00) IG%: 0 % (12/27/20 09:44:00)   Phosphorus:?5.4 mg/dL?High (12/27/20 09:44:00) IG#: 0.07 (12/27/20 09:44:00)   Lipase Lvl: 12 U/L (12/27/20 09:44:00)    Troponin-I: 0.014 ng/mL (12/27/20 09:44:00)    U pH: 5 (12/27/20 10:45:00)    Diagnostic Results  Order:?XR Chest 1 View  Report Dt/Tm:?12/27/2020 10:50  CLINICAL: ?Rule out infection.  COMPARISON: August 11, 2020?  FINDINGS: Cardiopericardial silhouette is within normal limits.?Lungs are without dense focal or segmental consolidation, congestion, pleural effusion or pneumothorax.??  IMPRESSION: No acute cardiopulmonary process identified.  ?  Order:?XR Abdomen Flat and Erect  Report Dt/Tm:?12/27/2020 09:51  Comparison: 10 November 2020  Findings: Flat and upright views of the abdomen demonstrate nonspecific,  nonobstructive bowel gas pattern. Scattered stool in the colon. No  free air is seen.  Impression: No acute radiographic findings.?      Refer to note above for specific recs for other medical issues.?  ?   I agree with Dr. Dial physical findings, medical reasoning, assessment and plan as mentioned above.?  ?  ?  Patient is a 40-year-old male with history of cannabinoid hyperemesis syndrome, GERD, iron deficiency anemia, hypertension presenting to [facility] ER with complaints of body aches, abdominal cramps, diarrhea since Christmas 12/25. ?Patient endorses nearly 6 episodes of none nonbloody diarrhea, alongside intractable nausea and vomiting. ?Patient endorses emesis which was initially clear and then slightly bloody at the mildest. ?Patient was last admitted for cannabinoid hyperemesis syndrome in October 2020. ?Patient endorses that he was last positive for cannabis usage in October then later changed his story to November. ?But upon repeat UDS in ER, patient is once again positive for cannabis today. ?Covid is negative. ?He has no antiemetics at home. ?In the ER patient reports feeling much better and  also clinically looks much improved after Zofran 8 mg once IV push. ?But more remarkably although patient clearly clinically looks better and vitals are also hemodynamically stable, patient is being admitted for new onset JORJE on CKD. ?  ?  Baseline creatinine is less than 1.4 but this time creatinine is 4.72 with BUN elevated at 57. ?Also patient has leukocytosis of 19.6 without bandemia, reactive thrombocytosis at 607. ?Of note, most recent EGD from July 17, 2019 indicative of hiatal hernia and irregular Z-line with pathology report indicative of squamocolumnar junctional mucosa with reactive epithelial changes, chronic inflammation and intraepithelial eosinophils up to 1 and high-power field consistent with GERD and without any goblet cells or dysplasia; patient has never had a colonoscopy before. ?Patient denied NSAID usage and denied heavy alcohol usage. ?Patient has EGD scheduled for August 2021.  ?  ?  Cannabinoid hyperemesis syndrome  Positive for cannabis on UDS  JORJE on CKD stage II. ?Likely secondary to GI losses from dehydration.  Hypercalcemia 11.0 secondary to above  Hyponatremia, hypochloremia, hypokalemia  Anion gap metabolic acidosis with gap of 28 likely secondary to dehydration  Reactive leukocytosis and thrombocytosis with concern for hemoconcentration  ?  ?  -Clinically patient feels much better after Zofran 8 mg IV; ordered Zofran 4 mg IV every 4 as needed for nausea  -For volume repletion okay to continue IVF at 150 cc/h  Okay to continue Protonix 40 IV daily  -Retroperitoneal ultrasound negative for hydronephrosis?  -Patient okay for discharge as long as creatinine is resolving  -Ensure that leukocytosis is not of intra-abdominal infectious origin, likely hemoconcentration, will continue to monitor  -ordered lactic acid and h pylori stool antigen and c diff toxin assay; no abx added as there is low concern for infectious etiology  ?  ?  - Jose Gooden, DO  PGY 2 Int Med Resident  Addendum   I was present with the resident during the history and physical examination.  ???  [x ] I discussed the case with the resident and agree with the findings and plan as documented in the residents note.  [ ] I discussed the case with the resident and agree with the findings and plan as documented in the residents note except:  N/V, resolved this am  Feels good  abd- soft, non tender  Hx of cannabinoid hyperemesis syndrome  JORJE  Fluids given with JORJE markedly improved  Tolerating po intake  One liter of fluids prior to discharge

## 2022-05-02 NOTE — HISTORICAL [facility] CERNER
This is a historical note converted from Enzo. Formatting and pictures may have been removed.  Please reference Enzo for original formatting and attached multimedia. Chief Complaint  follow up, rx refills, depressed X1 month  History of Present Illness  Mr. Pinzon?is a?40-year-old  male with PMH?of?cannabinoid hyperemesis syndrome,?marijuana abuse,?GERD, HTN,?and?obesity?who presents to [facility] for follow up visit. ?He reports?being seen in the ER 2 weeks ago for nausea which was diagnosed as a viral gastroenteritis. ?He states since then he has had no further abdominal pain, nausea, diarrhea,?or vomiting. ?Patient states he will is moving to [place] in September and is requesting 90-day?medication supply?at this time. ?Patient was noted to have hypercalcemia on ED?CMP and?will order?repeat CMP and PTH today. ?Hypercalcemia could be the result of?hydrochlorothiazide use. ?He reports he is no longer using cannabis. ?He does report some mild situational depression due to his elderly dog being sick and increase stressors at work. ?He denies any SI or suicidal?thoughts. ?Patient denies any?headaches, chest pain,?palpitations, shortness of breath, cough, dysuria,?dysuria, hematuria,?dark stools,?numbness or weakness. ?Patient will have?lab work?completed today. ?Patients blood pressure?was well controlled in clinic today. ?He has no further questions or concerns at this time.  Review of Systems  CONSTITUTIONAL: No weight loss, subjective fever, chills, weakness or fatigue.  HEENT: Eyes: No visual loss, blurred vision, double vision or yellow sclerae. Ears, Nose, Throat: No hearing loss, sneezing, congestion, runny nose or sore throat.  SKIN: No rash or itching.  CARDIOVASCULAR: No chest pain, chest pressure or chest discomfort. No palpitations or edema.  RESPIRATORY: No shortness of breath, cough or sputum.  GASTROINTESTINAL: No anorexia, nausea, vomiting or diarrhea. No abdominal pain or  blood.  GENITOURINARY: No dysuria, hematuria, or incontinence  NEUROLOGICAL: No headache, dizziness, syncope, paralysis, ataxia, numbness or tingling in the extremities. No change in bowel or bladder control.  MUSCULOSKELETAL: No muscle, back pain, joint pain or stiffness.  HEMATOLOGIC: No anemia, bleeding or bruising.  LYMPHATICS: No enlarged nodes.  PSYCHIATRIC:?+ history of depression  ENDOCRINOLOGIC: No reports of sweating, cold or heat intolerance. No polyuria or polydipsia.  ALLERGIES: No history of asthma, hives, eczema or rhinitis.  Physical Exam  Vitals & Measurements  T:?37.0? ?C (Oral)? HR:?103(Peripheral)? RR:?20? BP:?119/88?  HT:?159.00?cm? WT:?93.000?kg? BMI:?36.79?  Gen: He is alert and oriented x3. Well developed, obese, NAD.  Head: Normocephalic  Eyes: PERRL, EOMI, no conjunctival injection or pallor  Nose: No nasal discharge  Throat: No pharyngeal inflammation, erythema, discharge, mucous membranes moist  Neck: Supple. No carotid bruits.? No lymphadenopathy or thyromegaly.  Lungs: Clear to auscultation bilaterally, no wheezing, no rales, no rhonchi, no crackles  CV: Normal S1 & S2, RRR, no murmurs appreciated  Abdomen: Soft, NT/ND, bowel sounds present.? No hepatosplenomegaly  Ext: No cyanosis/clubbing/edema, pulses 2+  Neuro: CN II-XII grossly intact, strength 5/5 throughout, normal sensation  Psych: situational depression, no SI  Skin: No rashes, lesions, ulceration or induration  Assessment/Plan  Cannabinoid hyperemesis syndrome  -Reports cessation and?no further?nausea or vomiting  -Educated on continued avoidance of cannabis  -Patient agrees to?continue avoidance (06/09)  ?   Hypertension  -BP?119/88 mmHg (06/09)  -Refilled lisinopril-hydrochlorothiazide  -Continue on blood pressure regiment  -Educated on healthy diet and exercise, avoid?high salt meals  ?   Gastroesophageal reflux disease  -Refilled Protonix (06/09)  -Educated on avoidance of acidic and spicy foods and drinks  -Avoid late  night meals  -Continue to monitor  ?   Normocytic?Anemia  -H&H?13.1/41.8, MCV 82.6?(June 2021)  -Iron panel: Iron 41, transferrin 343, TIBC 376, ferritin 11, iron sat 29.9 (11/05/20)  -repeat iron profile and ferritin today (06/09)  -Patient denies any?gross bleeding, hematuria,?dark stools,?hemoptysis  -stopped?ferrous sulfate  -Continue to monitor  ?   Attending Physician Addendum  Management and plan discussed with resident?at time of clinic visit. Care was reasonable and necessary. Routine follow up.  Referrals  Clinic Follow up, *Est. 08/09/21 3:00:00 CDT, Order for future visit, Hypertension  Obesity  Tobacco user  GERD - Gastro-esophageal reflux disease  Hypercalcemia  Normocytic anemia, [facility]   Problem List/Past Medical History  Ongoing  Cannabis hyperemesis syndrome co-occurrent and due to cannabis abuse  GERD - Gastro-esophageal reflux disease  Hypertension  Obesity  Vomiting  Historical  Hypertension  Procedure/Surgical History  Biopsy Gastrointestinal (07/17/2019)  Esophagogastroduodenoscopy (07/17/2019)  Esophagogastroduodenoscopy, flexible, transoral; with biopsy, single or multiple (07/17/2019)  Excision of Esophagus, Via Natural or Artificial Opening Endoscopic, Diagnostic (07/17/2019)  Biopsy Gastrointestinal (07/18/2018)  Esophagogastroduodenoscopy (07/18/2018)  Esophagogastroduodenoscopy, flexible, transoral; with biopsy, single or multiple (07/18/2018)  Excision of Esophagogastric Junction, Via Natural or Artificial Opening Endoscopic (07/18/2018)  Esophagogastroduodenoscopy (06/15/2016)  Inspection of Upper Intestinal Tract, Via Natural or Artificial Opening Endoscopic (06/15/2016)  inguinal hernia repair (03.1982)   Medications  Compazine, 10 mg= 2 mL, IV Push, Once  diclofenac 1% topical gel, 2 gm, TOP, QID, PRN, 3 refills,? ?Not taking  ferrous sulfate 325 mg (65 mg elemental iron) oral delayed release tablet, 325 mg= 1 tab(s), Oral, Every other day, 3 refills,? ?Not  taking  hydrochlorothiazide-lisinopril 12.5 mg-20 mg oral tablet, 1 tab(s), Oral, Daily, 3 refills  Protonix 40 mg ORAL enteric coated tablet, 40 mg= 1 tab(s), Oral, Daily, 3 refills  Allergies  No Known Allergies  Social History  Abuse/Neglect  No, No, Yes, 06/09/2021  Alcohol  Current, Beer, Liquor, Daily, Alcohol use interferes with work or home: No. Others hurt by drinking: No. Household alcohol concerns: No., 06/09/2021  Employment/School  Employed, 02/20/2020  Home/Environment  Lives with Father, Mother. Living situation: Home/Independent., 02/20/2020    Never in , 06/09/2021  Nutrition/Health  Regular, Fair, 06/09/2021  Sexual  Gender Identity Identifies as male., 12/21/2020  Spiritual/Cultural  Confucianist, 06/09/2021  Substance Use  Past, Marijuana, 01/18/2021  Tobacco  Former smoker, quit more than 30 days ago, N/A, 06/09/2021  Family History  Hyperlipidemia.: Mother (Dx at 45).  Hypertension.: Mother and Father.  Immunizations  Vaccine Date Status   COVID-19 MRNA, LNP-S, PF- Pfizer 04/09/2021 Given   COVID-19 MRNA, LNP-S, PF- Pfizer 03/19/2021 Given   influenza virus vaccine, inactivated 09/29/2020 Given   influenza virus vaccine, inactivated 02/20/2020 Given   influenza virus vaccine, inactivated 10/20/2018 Given   tetanus/diphtheria/pertussis, acel(Tdap) 05/17/2018 Given   influenza virus vaccine, inactivated 11/30/2016 Given   poliovirus vaccine, live, trivalent 1981 Recorded   Health Maintenance  Health Maintenance  ???Pending?(in the next year)  ??? ??OverDue  ??? ? ? ?Influenza Vaccine due??10/01/20??and every 1??day(s)  ??? ??Due In Future?  ??? ? ? ?Hypertension Maintenance-Medication Prescribed not due until??08/20/21??and every 1??year(s)  ??? ? ? ?Hypertension Management-Education not due until??09/29/21??and every 1??year(s)  ??? ? ? ?Obesity Screening not due until??01/01/22??and every 1??year(s)  ??? ? ? ?Smoking Cessation not due until??01/01/22??and every 1??year(s)  ???  ? ? ?Alcohol Misuse Screening not due until??01/02/22??and every 1??year(s)  ??? ? ? ?Hypertension Management-BMP not due until??05/29/22??and every 1??year(s)  ???Satisfied?(in the past 1 year)  ??? ??Satisfied?  ??? ? ? ?ADL Screening on??06/09/21.??Satisfied by Alexa Barclay  ??? ? ? ?Alcohol Misuse Screening on??01/18/21.??Satisfied by Papa Jarrell DO  ??? ? ? ?Blood Pressure Screening on??06/09/21.??Satisfied by Alexa Barclay  ??? ? ? ?Body Mass Index Check on??06/09/21.??Satisfied by Alexa Barclay  ??? ? ? ?Depression Screening on??06/09/21.??Satisfied by Alexa Barclay  ??? ? ? ?Diabetes Screening on??05/29/21.??Satisfied by Saumya Genao  ??? ? ? ?Hypertension Management-Blood Pressure on??06/09/21.??Satisfied by Alexa Barclay  ??? ? ? ?Hypertension Management-BMP on??05/29/21.??Satisfied by Saumya Genao  ??? ? ? ?Hypertension Management-Education on??09/29/20.??Satisfied by Papa Jarrell DO  ??? ? ? ?Hypertension Maintenance-Medication Prescribed on??08/20/20.??Satisfied by Minh Sanchez  ??? ? ? ?Influenza Vaccine on??01/18/21.??Satisfied by Papa Jarrell DO  ??? ? ? ?Obesity Screening on??06/09/21.??Satisfied by Alexa Barclay  ??? ? ? ?Smoking Cessation on??01/18/21.??Satisfied by Papa Jarrell DO  ?  Lab Results  Reviewed  Diagnostic Results  Reviewed

## 2022-05-02 NOTE — HISTORICAL [facility]ERNER
This is a historical note converted from Enzo. Formatting and pictures may have been removed.  Please reference Enzo for original formatting and attached multimedia. Reason for Consultation  Intractable nausea and vomiting, hematemesis  History of Present Illness  This 39-year-old  male with a history of?GERD, cannabinoid?hyperemesis syndrome,?hypertension, tobacco use, and alcohol abuse?is hospitalized?for intractable nausea and vomiting.? He reports a longstanding history?of daily?marijuana use.? He has attempted to quit in the past?with minimal success.? He noted onset of nausea with subsequent episodes of intractable?vomiting?(nonbloody and nonbilious)?Sunday morning after smoking marijuana.? The vomiting?eventually improved?Sunday evening?before becoming?frequent again?Tuesday morning.? He tried?taking Phenergan at home with minimal relief noted.? He was unable to tolerate PO intake.? He also had?episodes of?loose to liquid stools?that were nonbloody?and without melena?since onset of vomiting?on Sunday morning. ?Prior to Sunday,?stools were described as?formed?in 1-2 times daily. ?He reported?having to stand?in the shower?secondary to?vomiting and diarrhea?at the same time. ?He presented to the ED?at [facility] a total of?3 times?on Tuesday.? He reported one episode of blood streaked emesis that was also described as sludge like prior to third presentation to the ED on Tuesday.?  ?  Upon arrival?to ED,?blood pressure was elevated at 141/96 mmHg?and pulse?rate?was?elevated at 107 bpm. ?Vital signs were otherwise stable.? Physical exam revealed moderate epigastric abdominal tenderness?without rebound or guarding and otherwise unremarkable.? COVID testing was negative.? Laboratory results?from first and?second ED visit revealed?WBC 16.4, hemoglobin 12.4, MCV 78.5, MCH 23.8, MCHC 30.2, RDW 15.9, absolute neutrophils 12.1, and otherwise unremarkable CBC;?sodium 134, chloride 94, calcium 10.4, glucose 131,  GFR?66, total protein 9.3, globulin 5.30,?and otherwise unremarkable CMP; lipase 61. ?UDS was positive for cannabis and otherwise unremarkable. ?Abdominal x-ray has been unremarkable from previous ED visit.? CT scan abdomen and?pelvis with contrast?revealed?circumferential thickening of the distal esophageal wall and prominence of the gastric folds?nonspecific?and otherwise unremarkable exam.? Previous?CT scan abdomen and pelvis with contrast May 4, 2020 revealed incidental nondisplaced right lateral ninth rib fracture?and otherwise unremarkable exam. ?He was administered Thorazine 25 mg IM. ?He was admitted?to medicine service?and GI was consulted for intractable nausea?and vomiting?and hematemesis.  ?  When seen for consultation, he reported feeling well.? He denied nausea, vomiting, or any further episodes of hematemesis.? He reports a poor appetite and stable weight.? He denies fever, chills, odynophagia, dysphagia, acid reflux, heartburn, belching, bloating, early satiety, or abdominal pain.? He takes pantoprazole 40 mg daily?since 2016.? He denies any further episodes of diarrhea and denies melena or?hematochezia.??He denies recent antibiotic use, foreign travel, exposure to sick contacts, or use of well water prior to hospitalization.? Laboratory results this morning revealed sodium 132, potassium 3.3, chloride 91, CO2 36, GFR 73, total protein 8.7,?globulin 4.90, and otherwise unremarkable CMP; WBC 12.6, hemoglobin 11.9, hematocrit 39.2, MCV 79.7, MCH?24.2, MCHC 30.4, RDW 15.9, absolute neutrophils 9.35, and otherwise unremarkable CBC.  ?  He underwent EGD?with Dr. Gibson?Ml 15, 2016 secondary to chronic emesis?and coffee-ground emesis with findings of esophagitis and small sliding hiatal hernia.? He underwent EGD with Dr. Mas?July 18, 2018 secondary to acid reflux with findings of salmon-colored mucosa concerning for Barretts esophagus?and 2 cm hiatal hernia.? Pathology revealed?mild chronic gastritis  and no Helicobacter organisms identified on Diff-Quik stains.? He underwent EGD with Dr. Callejas?July 17, 2019?secondary to heartburn?with findings of GE junction at 32 cm, hiatus at 32 cm, 2 cm hiatal hernia, irregular Z line at 32 cm, normal stomach with no abnormalities seen, and normal bulb and second portion of the duodenum.? Pathology?revealed squamocolumnar junctional mucosa with reactive epithelial changes, lamina propria fibrosis, chronic inflammation and intraepithelial eosinophils (up to 1 in a hpf), consistent with GERD, no goblet cells identified, and no dysplasia identified.? He denies ever having a colonoscopy done.? He takes ibuprofen?a few days per week?and denies use of blood thinners.? He denies tobacco use.? He drinks?5 alcoholic?drinks per week (whiskey or beer). ?He smokes marijuana daily.? He denies a family history of IBD, colon polyps, or colon cancer.  Review of Systems  Negative except as noted in the HPI.  Physical Exam  Vitals & Measurements  T:?36.8? ?C (Oral)? TMIN:?36.8? ?C (Oral)? TMAX:?37.8? ?C (Oral)? HR:?97(Peripheral)? RR:?14? BP:?142/99? SpO2:?97%? WT:?76.3?kg? BMI:?30.18?  General:?well-developed, well-nourished, in no acute distress  Eye: PERRLA, EOMI, clear conjunctiva, eyelids normal  HENT:?oropharynx without erythema/exudate, oropharynx and nasal mucosal surfaces moist  Neck: full range of motion, no lymphadenopathy  Respiratory:?clear to auscultation bilaterally  Cardiovascular:?regular rate and rhythm without murmurs, gallops or rubs  Gastrointestinal:?soft, non-tender, non-distended with normal bowel sounds, without masses to palpation  Musculoskeletal:?full range of motion of all extremities/spine without limitation or discomfort  Integumentary: no rashes or skin lesions present  Neurologic: motor/sensory function intact  Assessment/Plan  Intractable?nausea and vomiting  Cannabinoid hyperemesis syndrome  Hematemesis  Abnormal CT  scan  Hypokalemia  Hyponatremia  Leukocytosis  Microcytic anemia  Tobacco use  Alcohol abuse  ?  Will hold off on EGD at this time, can consider outpatient if symptoms persist or worsen  Continue Protonix 40 mg daily  We will add sucralfate 1 g before meals and at bedtime?for 2 weeks  Can check iron profile and ferritin  Correct electrolyte abnormalities  Will follow?hemoglobin  Monitor stools for blood  GERD lifestyle modifications  Reflux precautions  Recommend avoidance of NSAID use  Recommend cessation of cannabinoid use  Recommend tobacco and alcohol cessation   Problem List/Past Medical History  Ongoing  Cannabis hyperemesis syndrome co-occurrent and due to cannabis abuse  GERD - Gastro-esophageal reflux disease  Hypertension  Obesity  Vomiting  Historical  Hypertension  Procedure/Surgical History  Biopsy Gastrointestinal (07/17/2019)  Esophagogastroduodenoscopy (07/17/2019)  Esophagogastroduodenoscopy, flexible, transoral; with biopsy, single or multiple (07/17/2019)  Excision of Esophagus, Via Natural or Artificial Opening Endoscopic, Diagnostic (07/17/2019)  Biopsy Gastrointestinal (07/18/2018)  Esophagogastroduodenoscopy (07/18/2018)  Esophagogastroduodenoscopy, flexible, transoral; with biopsy, single or multiple (07/18/2018)  Excision of Esophagogastric Junction, Via Natural or Artificial Opening Endoscopic (07/18/2018)  Esophagogastroduodenoscopy (06/15/2016)  Inspection of Upper Intestinal Tract, Via Natural or Artificial Opening Endoscopic (06/15/2016)  inguinal hernia repair (03.1982)   Medications  Inpatient  acetaminophen, 1000 mg= 2 tab(s), Oral, q6hr, PRN  Compazine, 10 mg= 2 mL, IV Push, Once  Lactated Ringers 1000ml 1,000 mL, 1000 mL, IV  lisinopril, 20 mg= 2 tab(s), Oral, Daily  Protonix 40 mg ORAL enteric coated tablet, 40 mg= 1 tab(s), Oral, Daily  Thorazine, 25 mg= 1 tab(s), Oral, q4hr, PRN  Home  capsaicin 0.025% topical cream, 1 wendy, TOP, Daily, PRN  capsaicin 0.025% topical cream, 1 wendy,  TOP, Daily, PRN  lisinopril 20 mg oral tablet, 20 mg= 1 tab(s), Oral, Daily, 5 refills  Protonix 40 mg ORAL enteric coated tablet, 40 mg= 1 tab(s), Oral, Daily, 7 refills  Zofran ODT 4 mg oral tablet, disintegrating, 4 mg= 1 tab(s), Oral, TID, PRN  Allergies  No Known Allergies  Social History  Abuse/Neglect  No, 06/23/2020  No, No, Yes, 06/23/2020  No, 05/05/2020  Alcohol  Current, 1-2 times per week, 02/20/2020  Employment/School  Employed, 02/20/2020  Exercise  Exercise duration: 0., 02/20/2020  Home/Environment  Lives with Father, Mother. Living situation: Home/Independent., 02/20/2020  Nutrition/Health  Regular, 02/20/2020  Substance Use  Current, Marijuana, 06/23/2020  Current, Marijuana, Daily, 06/21/2020  Past, 05/04/2020  Tobacco  Former smoker, quit more than 30 days ago, Cigarettes, N/A, 06/23/2020  Former smoker, quit more than 30 days ago, No, 06/23/2020  Former smoker, quit more than 30 days ago, No, 05/05/2020  Smoker, current status unknown, No, 05/04/2020  Former smoker, quit more than 30 days ago, No, 05/01/2020  Family History  Hyperlipidemia.: Mother (Dx at 45).  Hypertension.: Mother and Father.  Immunizations  Vaccine Date Status   influenza virus vaccine, inactivated 02/20/2020 Given   influenza virus vaccine, inactivated 10/20/2018 Given   tetanus/diphtheria/pertussis, acel(Tdap) 05/17/2018 Given   influenza virus vaccine, inactivated 11/30/2016 Given   Lab Results  Labs Last 72 Hours?  ?Chemistry? Hematology/Coagulation?   Sodium Lvl:?132 mmol/L?Low (06/24/20 07:15:00) WBC:?12.6 x10(3)/mcL?High (06/24/20 07:15:00)   Sodium Lvl: N/A (06/24/20 05:30:00) WBC: N/A (06/24/20 05:30:00)   Potassium Lvl:?3.3 mmol/L?Low (06/24/20 07:15:00) WBC:?16.4 x10(3)/mcL?High (06/23/20 19:23:00)   Potassium Lvl: N/A (06/24/20 05:30:00) RBC: 4.92 x10(6)/mcL (06/24/20 07:15:00)   Chloride:?91 mmol/L?Low (06/24/20 07:15:00) RBC: N/A (06/24/20 05:30:00)   Chloride: N/A (06/24/20 05:30:00) RBC: 5.22 x10(6)/mcL  (06/23/20 19:23:00)   CO2:?36 mmol/L?High (06/24/20 07:15:00) Hgb:?11.9 gm/dL?Low (06/24/20 07:15:00)   CO2: N/A (06/24/20 05:30:00) Hgb: N/A (06/24/20 05:30:00)   Calcium Lvl: 9.5 mg/dL (06/24/20 07:15:00) Hgb:?12.4 gm/dL?Low (06/23/20 19:23:00)   Calcium Lvl: N/A (06/24/20 05:30:00) Hct:?39.2 %?Low (06/24/20 07:15:00)   Glucose Lvl: 103 mg/dL (06/24/20 07:15:00) Hct: N/A (06/24/20 05:30:00)   Glucose Lvl: N/A (06/24/20 05:30:00) Hct: 41 % (06/23/20 19:23:00)   BUN: 16 mg/dL (06/24/20 07:15:00) Platelet: 335 x10(3)/mcL (06/24/20 07:15:00)   BUN: N/A (06/24/20 05:30:00) Platelet: N/A (06/24/20 05:30:00)   Creatinine: 1.19 mg/dL (06/24/20 07:15:00) Platelet: 369 x10(3)/mcL (06/23/20 19:23:00)   Creatinine: N/A (06/24/20 05:30:00) MCV:?79.7 fL?Low (06/24/20 07:15:00)   eGFR-AA:?88 mL/min?Low (06/24/20 07:15:00) MCV: N/A (06/24/20 05:30:00)   eGFR-AA: N/A (06/24/20 05:30:00) MCV:?78.5 fL?Low (06/23/20 19:23:00)   eGFR-VEL:?73 mL/min?Low (06/24/20 07:15:00) MCH:?24.2 pg?Low (06/24/20 07:15:00)   eGFR-VEL: N/A (06/24/20 05:30:00) MCH: N/A (06/24/20 05:30:00)   Bili Total: 0.4 mg/dL (06/24/20 07:15:00) MCH:?23.8 pg?Low (06/23/20 19:23:00)   Bili Total: N/A (06/24/20 05:30:00) MCHC:?30.4 gm/dL?Low (06/24/20 07:15:00)   Bili Direct: 0.2 mg/dL (06/24/20 07:15:00) MCHC: N/A (06/24/20 05:30:00)   Bili Direct: N/A (06/24/20 05:30:00) MCHC:?30.2 gm/dL?Low (06/23/20 19:23:00)   Bili Indirect: 0.2 mg/dL (06/24/20 07:15:00) RDW:?15.9 %?High (06/24/20 07:15:00)   Bili Indirect: 0.4 mg/dL (06/24/20 05:30:00) RDW: N/A (06/24/20 05:30:00)   AST: 17 unit/L (06/24/20 07:15:00) RDW:?15.9 %?High (06/23/20 19:23:00)   AST: N/A (06/24/20 05:30:00) MPV: 9.7 fL (06/24/20 07:15:00)   ALT: 30 unit/L (06/24/20 07:15:00) MPV: N/A (06/24/20 05:30:00)   ALT: N/A (06/24/20 05:30:00) MPV: 10.1 fL (06/23/20 19:23:00)   Alk Phos: 91 unit/L (06/24/20 07:15:00) Abs Neut:?9.35 x10(3)/mcL?High (06/24/20 07:15:00)   Alk Phos: N/A (06/24/20 05:30:00) Abs  Neut: N/A (06/24/20 05:30:00)   Total Protein:?8.7 gm/dL?High (06/24/20 07:15:00) Abs Neut:?12.81 x10(3)/mcL?High (06/23/20 19:23:00)   Total Protein: N/A (06/24/20 05:30:00) Neutro Auto: 74 % (06/24/20 07:15:00)   Albumin Lvl: 3.8 gm/dL (06/24/20 07:15:00) Neutro Auto: N/A (06/24/20 05:30:00)   Albumin Lvl: N/A (06/24/20 05:30:00) Neutro Auto: 78 % (06/23/20 19:23:00)   Globulin:?4.9 gm/mL?High (06/24/20 07:15:00) Lymph Auto: 19 % (06/24/20 07:15:00)   Globulin:?6.1 gm/mL?High (06/24/20 05:30:00) Lymph Auto: N/A (06/24/20 05:30:00)   A/G Ratio:?0.8 ratio?Low (06/24/20 07:15:00) Lymph Auto: 13 % (06/23/20 19:23:00)   A/G Ratio:?0.3 ratio?Low (06/24/20 05:30:00) Mono Auto: 6 % (06/24/20 07:15:00)   U pH: 6 (06/23/20 22:21:00) Mono Auto: N/A (06/24/20 05:30:00)    Mono Auto: 8 % (06/23/20 19:23:00)    Eos Auto: 0 % (06/24/20 07:15:00)    Eos Auto: N/A (06/24/20 05:30:00)    Abs Eos: 0 x10(3)/mcL (06/24/20 07:15:00)    Abs Eos: N/A (06/24/20 05:30:00)    Basophil Auto: 0 % (06/24/20 07:15:00)    Basophil Auto: N/A (06/24/20 05:30:00)    Basophil Auto: 0 % (06/23/20 19:23:00)    Abs Neutro:?9.35 x10(3)/mcL?High (06/24/20 07:15:00)    Abs Neutro: N/A (06/24/20 05:30:00)    Abs Neutro:?12.81 x10(3)/mcL?High (06/23/20 19:23:00)    Abs Lymph: 2.4 x10(3)/mcL (06/24/20 07:15:00)    Abs Lymph: N/A (06/24/20 05:30:00)    Abs Lymph: 2.2 x10(3)/mcL (06/23/20 19:23:00)    Abs Mono: 0.8 x10(3)/mcL (06/24/20 07:15:00)    Abs Mono: N/A (06/24/20 05:30:00)    Abs Mono: 1.3 x10(3)/mcL (06/23/20 19:23:00)    Abs Baso: 0 x10(3)/mcL (06/24/20 07:15:00)    Abs Baso: N/A (06/24/20 05:30:00)    Abs Baso: 0 x10(3)/mcL (06/23/20 19:23:00)    IG%: N/A (06/24/20 05:30:00)    IG%: 0 % (06/23/20 19:23:00)    IG#: 0 (06/24/20 07:15:00)    IG#: N/A (06/24/20 05:30:00)    IG#: 0.06 (06/23/20 19:23:00)    Platelet Est: N/A (06/24/20 05:30:00)    Anisocyte: N/A (06/24/20 05:30:00)    Poik: N/A (06/24/20 05:30:00)    Polychrom: N/A (06/24/20 05:30:00)     RBC Morph: N/A (06/24/20 05:30:00)    Schistocyte: N/A (06/24/20 05:30:00)   Diagnostic Results  Accession:?[id number]  Order:?XR Chest 2 Views  Report Dt/Tm:?06/24/2020 09:14  Report:?  XR Chest 2 Views  ?  REASON FOR STUDY: Hemoptysis  ?  COMPARISON: Chest one view 03/19/2020  ?  FINDINGS: The bilateral lungs are clear and unremarkable. No  pneumothorax or pleural fluid. The heart and mediastinum are  unremarkable. The osseous structures are unremarkable.  ?  ?  IMPRESSION: As above.  ?  ?  Accession:?[id number]  Order:?CT Abdomen and Pelvis W Contrast  Report Dt/Tm:?06/23/2020 21:04  Report:?  EXAMINATION  CT Abdomen and Pelvis W Contrast  ?  TECHNIQUE  ?? ? Helical-acquisition CT images were obtained following the  intravenous administration of iodinated contrast media. Enteric  contrast was not utilized.  ?? ? Multiplanar reformats were accomplished by a CT technologist at a  separate workstation and pushed to PACS for physician review.  ?  Total DLP (mGy-cm): 1037.6  (value may include radiation due to concomitantly performed CT  imaging)  ?? ? Automated tube current modulation and/or weight-based exposure  dosing is utilized, when appropriate, to reach lowest reasonably  achievable exposure rate.  ?  INDICATION  Nausea, vomiting, diarrhea  ?  Comparison: 4 May, 2020  ?  FINDINGS  Images were reviewed in soft tissue, lung, and bone windows.  ?  Exam quality: adequate  ?  Lines/tubes: None visualized.  ?  Lower thoracic cavity  ?? ? Heart: No acute or focal abnormality.  ?? ? Lung bases: No acute or focal abnormality.  ?  Hepatobiliary  ?? ? Liver: No acute or suspicious focal abnormality. Small cystic  appearing irregularity at the inferior margin of hepatic segment 5 is  unchanged.  ?? ? Gallbladder: No calcified stone, wall thickening, or  pericholecystic inflammation.  ?? ? Bile ducts: No convincing obstructive process.  ?  Pancreas: No acute or focal abnormality.  ?  Spleen: No acute or focal  abnormality.  ?  Adrenal glands: No acute or focal abnormality.  ?  Genitourinary  ?? ? Kidneys/ureters: Left pelvic kidney is again noted. No new focal  renal lesion is identified. There is no complex cyst. No urolithiasis  or evidence of obstructive uropathy.  ?? ? Urinary bladder: No focal wall thickening or convincing  intraluminal abnormality.  ?? ? Prostate: Unremarkable for CT assessment.  ?  Gastrointestinal  ?? ? Esophagus: Circumferential mural thickening is appreciated at the  visualized distal colon, without focal asymmetry or other suspicious  discrete lesion.  ?? ? Stomach: There is relative prominent appearance of the rugal  folds, without focal intraluminal abnormality.  ?? ? Small bowel: No focal abnormality or suggestion of high-grade  obstruction.  ?? ? Appendix: No acute or focal abnormality.  ?? ? Large bowel: No acute or focal abnormality.  ?  Peritoneal cavity/Retroperitoneum: No free fluid or air.  ?  Lymph nodes: No pathologic enlargement or necrotic process.  ?  Vasculature: No acute or focal abnormality.  ?  Musculoskeletal  ?? ? Subcutaneous/musculature: Uncomplicated fat-containing umbilical  and bilateral inguinal hernias are similar in the interval. No new  focal body wall abnormality is identified.  ?? ? Osseous: There are degenerative changes of the spinal column and  bony pelvis. No acute osseous displacement or destructive focal lesion  is appreciated. Progressed healing changes of the nondisplaced right  9th rib fracture is incidentally mentioned.  ?  IMPRESSION  1. ?Circumferential thickening of the distal esophageal wall and  prominence of the gastric folds are nonspecific, but may reflect  sequela of esophagitis/gastritis.  2. ?Otherwise, no evidence of acute or new focal abdominopelvic  process.  3. ?Chronic secondary details discussed above.      Patient seen and examined in conjunction with the [facility]? 06/24.? I actively participated in all aspects of todays  patient encounter along with the NP, and agree with the assessment and plan as outlined above. ?  ?  Cannabinoid Hyperemesis syndrome, daily THC?user. He has had reassuring EGDs in the?past and is on a PPI at home.? No other high risk features such as NSAID use.??Had small volume hematemesis due to multiple episodes of emesis.? Suspect more likely esophagitis, perhaps small MW tear.? No further episodes of bleeding or melena.? He can be treated conservatively with antiemetics and diet advancement as tolerated.? Stop tHC which may take several months for him to truly have resolution of symptoms given the?amount of tHC he uses. ?

## 2022-05-02 NOTE — HISTORICAL [facility] CERNER
This is a historical note converted from Enzo. Formatting and pictures may have been removed.  Please reference Enzo for original formatting and attached multimedia. Chief Complaint  Iron Def Anemia. ?Pt with complaints of vomiting. ?Hx of barretts.  History of Present Illness  This 40-year-old  male with a history of?GERD, cannabinoid?hyperemesis syndrome,?hypertension, tobacco use, and alcohol abuse?presents unaccompanied?for hospital follow-up.? He was hospitalized?at [facility] June 23, 2020?to June 25, 2020?for cannabinoid hyperemesis syndrome, hematemesis,?acute gastritis, and diarrhea.? He reported a longstanding history?of daily?marijuana use.? He had attempted to quit in the past?with minimal success.? He noted onset of nausea with subsequent episodes of intractable?vomiting?(nonbloody and nonbilious)?Sunday morning after smoking marijuana.? The vomiting?eventually improved?Sunday evening?before becoming?frequent again?Tuesday morning.? He tried?taking Phenergan at home with minimal relief noted.? He was unable to tolerate PO intake.? He also had?episodes of?loose to liquid stools?that were nonbloody?and without melena?since onset of vomiting?on Sunday morning. ?Prior to Sunday,?stools were described as?formed?and 1-2 times daily. ?He reported?having to stand?in the shower?secondary to?vomiting and diarrhea?at the same time. ?He presented to the ED?at [facility] a total of?3 times?on Tuesday.? He reported one episode of blood streaked emesis that was also described as sludge like prior to third presentation to the ED on Tuesday.?  ?   Upon arrival?to ED,?blood pressure was elevated at 141/96 mmHg?and pulse?rate?was?elevated at 107 bpm. ?Vital signs were otherwise stable.? Physical exam revealed moderate epigastric abdominal tenderness?without rebound or guarding and otherwise unremarkable.? COVID testing was negative.? Laboratory results?from first and?second ED visit revealed?WBC 16.4, hemoglobin 12.4, MCV  78.5, MCH 23.8, MCHC 30.2, RDW 15.9, absolute neutrophils 12.1, and otherwise unremarkable CBC;?sodium 134, chloride 94, calcium 10.4, glucose 131, GFR?66, total protein 9.3, globulin 5.30,?and otherwise unremarkable CMP; lipase 61. ?UDS was positive for cannabis and otherwise unremarkable. ?Abdominal x-ray was unremarkable from previous ED visit.? CT scan abdomen and?pelvis with contrast?revealed?circumferential thickening of the distal esophageal wall and prominence of the gastric folds?nonspecific?and otherwise unremarkable exam.? Previous?CT scan abdomen and pelvis with contrast May 4, 2020 revealed incidental nondisplaced right lateral ninth rib fracture?and otherwise unremarkable exam. ?He was administered Thorazine 25 mg IM. ?He was admitted?to medicine service?and GI was consulted for intractable nausea?and vomiting?and hematemesis.  ?   I?saw him for consultation June 24, 2020?and?he reported feeling well.? He denied nausea, vomiting, or any further episodes of hematemesis.? He reported a poor appetite and stable weight.? He denied fever, chills, odynophagia, dysphagia, acid reflux, heartburn, belching, bloating, early satiety, or abdominal pain.? He?took pantoprazole 40 mg daily?since 2016.? He denied any further episodes of diarrhea and denied melena or?hematochezia.??He denied recent antibiotic use, foreign travel, exposure to sick contacts, or use of well water prior to hospitalization.? Laboratory results June 24, 2020 revealed sodium 132, potassium 3.3, chloride 91, CO2 36, GFR 73, total protein 8.7,?globulin 4.90, and otherwise unremarkable CMP; WBC 12.6, hemoglobin 11.9, hematocrit 39.2, MCV 79.7, MCH?24.2, MCHC 30.4, RDW 15.9, absolute neutrophils 9.35, and otherwise unremarkable CBC.  ?   He was recommended lifestyle and dietary modifications, cessation of cannabinoid use, conservative?treatment measures with antiemetics and diet advancement as tolerated.? EGD was held off?at time of  hospitalization. ?He was advised?to continue Protonix?40 mg daily?and start sucralfate 1 g before meals and at bedtime.? He was found to be iron deficient and started on oral iron supplementation.  ?   He was hospitalized at [facility] August 11, 2020 to August 12, 2020?for presyncope and nausea and vomiting.? Upon arrival to ED, he was hypotensive with blood pressure of 60/37 mmHg and tachycardic with a pulse rate of 105?bpm.? WBC?12.2, hemoglobin 12.7, sodium 130, potassium 2.5, chloride 86, bicarbonate?34, creatinine 2.4, UDS positive?for marijuana.? He was given 3 L of lactated Ringers?and blood pressure improved to 116/61 mmHg.? He also received?magnesium and potassium replacement.? Renal function improved with hydration.  ?   Today, he presents for a hospital follow-up.? He?continues to take?oral iron supplementation once daily.? Iron studies September 25, 2020 revealed iron 38, transferrin 328,?TIBC 469, iron saturation 8.1%, ferritin 11.3. ?He reports that he hasnt?smoked marijuana?over the past week and is currently awaiting rehabilitation program.? He hasnt had any episodes of nausea and vomiting since he stopped smoking marijuana.? Over the weekend, he had?constant episodes of acid reflux described as burning in his?chest and up into the back of his throat?that awoke him from sleep several times per night all weekend.? He was taking OTC Tums?on top of Protonix 40 mg daily with minimal to moderate relief noted.? His appetite is good and his weight is stable. ?He denies fever, chills, odynophagia, dysphagia, early satiety, or abdominal pain. ?Bowel habits are described as 1-2 formed stools daily without melena, hematochezia, fecal urgency, fecal incontinence, or pain with defecation.  ?   He underwent EGD?with Dr. Gibson?Ml 15, 2016 secondary to chronic emesis?and coffee-ground emesis with findings of esophagitis and small sliding hiatal hernia.? He underwent EGD with Dr. Mas?July 18, 2018 secondary to acid  reflux with findings of salmon-colored mucosa concerning for Barretts esophagus?and 2 cm hiatal hernia.? Pathology revealed?mild chronic gastritis and no Helicobacter organisms identified on Diff-Quik stains.? He underwent EGD with Dr. Callejas?July 17, 2019?secondary to heartburn?with findings of GE junction at 32 cm, hiatus at 32 cm, 2 cm hiatal hernia, irregular Z line at 32 cm, normal stomach with no abnormalities seen, and normal bulb and second portion of the duodenum.? Pathology?revealed squamocolumnar junctional mucosa with reactive epithelial changes, lamina propria fibrosis, chronic inflammation and intraepithelial eosinophils (up to 1 in a hpf), consistent with GERD, no goblet cells identified, and no dysplasia identified.? He denies ever having a colonoscopy done.? He stopped ibuprofen use in September 2020 and denies use of blood thinners.? He denies tobacco use.? He drinks?5 alcoholic?drinks per week (whiskey or beer).??He denies a family history of IBD, colon polyps, or colon cancer.  Review of Systems  Negative except as noted in the HPI.  Physical Exam  Vitals & Measurements  T:?36.8? ?C (Oral)? HR:?76(Peripheral)? RR:?16? BP:?115/80?  HT:?160.00?cm? WT:?95.200?kg? BMI:?37.19?  General:?well-developed, well-nourished, in no acute distress  Eye: PERRLA, EOMI, clear conjunctiva, eyelids normal  HENT:?oropharynx without erythema/exudate, oropharynx and nasal mucosal surfaces moist  Neck: full range of motion, no lymphadenopathy  Respiratory:?clear to auscultation bilaterally  Cardiovascular:?regular rate and rhythm without murmurs, gallops or rubs  Gastrointestinal:?soft, increased adiposity, non-tender, non-distended with normal bowel sounds, without masses to palpation  Musculoskeletal:?full range of motion of all extremities/spine without limitation or discomfort  Integumentary: no rashes or skin lesions present  Neurologic: motor/sensory function intact  Assessment/Plan  1.?Nausea and  vomiting?R11.2  Resolved since cessation of marijuana one week ago and awaiting rehabilitation  GERD lifestyle modifications  Reflux precautions  Avoid NSAID use  Recommend alcohol cessation and continued cessation of marijuana use  Continue pantoprazole 40 mg daily  CBC, CMP  EGD  Underwent EGD?with Dr. Gibson?Ml 15, 2016 secondary to chronic emesis?and coffee-ground emesis with findings of esophagitis and small sliding hiatal hernia.?  Underwent EGD with Dr. Mas?July 18, 2018 secondary to acid reflux with findings of salmon-colored mucosa concerning for Barretts esophagus?and 2 cm hiatal hernia.? Pathology revealed?mild chronic gastritis and no Helicobacter organisms identified on Diff-Quik stains.?  Underwent EGD with Dr. Callejas?July 17, 2019?secondary to heartburn?with findings of GE junction at 32 cm, hiatus at 32 cm, 2 cm hiatal hernia, irregular Z line at 32 cm, normal stomach with no abnormalities seen, and normal bulb and second portion of the duodenum.? Pathology?revealed squamocolumnar junctional mucosa with reactive epithelial changes, lamina propria fibrosis, chronic inflammation and intraepithelial eosinophils (up to 1 in a hpf), consistent with GERD, no goblet cells identified, and no dysplasia identified.  Call with updates  Ordered:  1160F- Medication reconciliation completed during visit, Nausea and vomiting  Cannabis hyperemesis syndrome co-occurrent and due to cannabis abuse  Abnormal CT scan  Iron deficiency anemia  Acid reflux  Alcohol abuse, [facility], 11/05/20 13:35:00 CST  CBC w/ Auto Diff, Routine collect, 11/05/20 13:39:00 CST, Blood, Stop date 11/05/20 13:39:00 CST, Lab Collect, Nausea and vomiting  Cannabis hyperemesis syndrome co-occurrent and due to cannabis abuse  Iron deficiency anemia, Print Label By Order Location, 11/05/20 1...  Comprehensive Metabolic Panel, Routine collect, 11/05/20 13:39:00 CST, Blood, Stop date 11/05/20 13:39:00 CST, Lab Collect, Nausea and  vomiting  Cannabis hyperemesis syndrome co-occurrent and due to cannabis abuse  Iron deficiency anemia, Print Label By Order Location, 11/05/20 1...  Office/Outpatient Visit Level 4 New [id number] PC, Nausea and vomiting  Cannabis hyperemesis syndrome co-occurrent and due to cannabis abuse  Abnormal CT scan  Iron deficiency anemia  Acid reflux  Alcohol abuse, [facility], 11/05/20 13:35:00 CST  ?  2.?Cannabis hyperemesis syndrome co-occurrent and due to cannabis abuse?F12.188  See above  Ordered:  1160F- Medication reconciliation completed during visit, Nausea and vomiting  Cannabis hyperemesis syndrome co-occurrent and due to cannabis abuse  Abnormal CT scan  Iron deficiency anemia  Acid reflux  Alcohol abuse, [facility], 11/05/20 13:35:00 CST  CBC w/ Auto Diff, Routine collect, 11/05/20 13:39:00 CST, Blood, Stop date 11/05/20 13:39:00 CST, Lab Collect, Nausea and vomiting  Cannabis hyperemesis syndrome co-occurrent and due to cannabis abuse  Iron deficiency anemia, Print Label By Order Location, 11/05/20 1...  Comprehensive Metabolic Panel, Routine collect, 11/05/20 13:39:00 CST, Blood, Stop date 11/05/20 13:39:00 CST, Lab Collect, Nausea and vomiting  Cannabis hyperemesis syndrome co-occurrent and due to cannabis abuse  Iron deficiency anemia, Print Label By Order Location, 11/05/20 1...  Office/Outpatient Visit Level 4 New [id number] PC, Nausea and vomiting  Cannabis hyperemesis syndrome co-occurrent and due to cannabis abuse  Abnormal CT scan  Iron deficiency anemia  Acid reflux  Alcohol abuse, [facility], 11/05/20 13:35:00 CST  ?  3.?Abnormal CT scan?R93.89  See above?  CT scan abdomen and?pelvis with contrast?June 23, 2020 revealed?circumferential thickening of the distal esophageal wall and prominence of the gastric folds?nonspecific?and otherwise unremarkable exam.  Ordered:  1160F- Medication reconciliation completed during visit, Nausea and vomiting  Cannabis hyperemesis syndrome  co-occurrent and due to cannabis abuse  Abnormal CT scan  Iron deficiency anemia  Acid reflux  Alcohol abuse, [facility], 11/05/20 13:35:00 CST  Office/Outpatient Visit Level 4 New [id number] PC, Nausea and vomiting  Cannabis hyperemesis syndrome co-occurrent and due to cannabis abuse  Abnormal CT scan  Iron deficiency anemia  Acid reflux  Alcohol abuse, [facility], 11/05/20 13:35:00 CST  ?  4.?Iron deficiency anemia?D50.9  See above  Iron studies September 25, 2020 revealed iron 38, transferrin 328,?TIBC 469, iron saturation 8.1%, ferritin 11.3.  CBC,?iron profile, ferritin  EGD  Ordered:  1160F- Medication reconciliation completed during visit, Nausea and vomiting  Cannabis hyperemesis syndrome co-occurrent and due to cannabis abuse  Abnormal CT scan  Iron deficiency anemia  Acid reflux  Alcohol abuse, [facility], 11/05/20 13:35:00 CST  CBC w/ Auto Diff, Routine collect, 11/05/20 13:39:00 CST, Blood, Stop date 11/05/20 13:39:00 CST, Lab Collect, Nausea and vomiting  Cannabis hyperemesis syndrome co-occurrent and due to cannabis abuse  Iron deficiency anemia, Print Label By Order Location, 11/05/20 1...  Comprehensive Metabolic Panel, Routine collect, 11/05/20 13:39:00 CST, Blood, Stop date 11/05/20 13:39:00 CST, Lab Collect, Nausea and vomiting  Cannabis hyperemesis syndrome co-occurrent and due to cannabis abuse  Iron deficiency anemia, Print Label By Order Location, 11/05/20 1...  Ferritin, Routine collect, 11/05/20 13:39:00 CST, Blood, Stop date 11/05/20 13:39:00 CST, Lab Collect, Iron deficiency anemia, Print Label By Order Location, 11/05/20 13:39:00 CST  Iron Binding Capacity Total - Iron Profile, Routine collect, 11/05/20 13:39:00 CST, Blood, Stop date 11/05/20 13:39:00 CST, Lab Collect, Iron deficiency anemia, Print Label By Order Location, 11/05/20 13:39:00 CST  Office/Outpatient Visit Level 4 New [id number] PC, Nausea and vomiting  Cannabis hyperemesis syndrome co-occurrent and due  to cannabis abuse  Abnormal CT scan  Iron deficiency anemia  Acid reflux  Alcohol abuse, [facility], 11/05/20 13:35:00 CST  ?  5.?Acid reflux?K21.9  GERD lifestyle modifications  Reflux precautions  Avoid NSAID use  Recommend alcohol cessation and continued cessation of marijuana use  Continue pantoprazole 40 mg daily  EGD  Underwent EGD?with Dr. Gibson?Ml 15, 2016 secondary to chronic emesis?and coffee-ground emesis with findings of esophagitis and small sliding hiatal hernia.?  Underwent EGD with Dr. Mas?July 18, 2018 secondary to acid reflux with findings of salmon-colored mucosa concerning for Barretts esophagus?and 2 cm hiatal hernia.? Pathology revealed?mild chronic gastritis and no Helicobacter organisms identified on Diff-Quik stains.?  Underwent EGD with Dr. Callejas?July 17, 2019?secondary to heartburn?with findings of GE junction at 32 cm, hiatus at 32 cm, 2 cm hiatal hernia, irregular Z line at 32 cm, normal stomach with no abnormalities seen, and normal bulb and second portion of the duodenum.? Pathology?revealed squamocolumnar junctional mucosa with reactive epithelial changes, lamina propria fibrosis, chronic inflammation and intraepithelial eosinophils (up to 1 in a hpf), consistent with GERD, no goblet cells identified, and no dysplasia identified.  Call with updates  Ordered:  1160F- Medication reconciliation completed during visit, Nausea and vomiting  Cannabis hyperemesis syndrome co-occurrent and due to cannabis abuse  Abnormal CT scan  Iron deficiency anemia  Acid reflux  Alcohol abuse, [facility], 11/05/20 13:35:00 CST  Office/Outpatient Visit Level 4 New [id number] PC, Nausea and vomiting  Cannabis hyperemesis syndrome co-occurrent and due to cannabis abuse  Abnormal CT scan  Iron deficiency anemia  Acid reflux  Alcohol abuse, [facility], 11/05/20 13:35:00 CST  ?  6.?Alcohol abuse?F10.10  Recommend alcohol cessation  Ordered:  1160F- Medication reconciliation  completed during visit, Nausea and vomiting  Cannabis hyperemesis syndrome co-occurrent and due to cannabis abuse  Abnormal CT scan  Iron deficiency anemia  Acid reflux  Alcohol abuse, [facility], 11/05/20 13:35:00 CST  Office/Outpatient Visit Level 4 New [id number] PC, Nausea and vomiting  Cannabis hyperemesis syndrome co-occurrent and due to cannabis abuse  Abnormal CT scan  Iron deficiency anemia  Acid reflux  Alcohol abuse, [facility], 11/05/20 13:35:00 CST  ?   Problem List/Past Medical History  Ongoing  Cannabis hyperemesis syndrome co-occurrent and due to cannabis abuse  GERD - Gastro-esophageal reflux disease  Hypertension  Obesity  Vomiting  Historical  Hypertension  Procedure/Surgical History  Biopsy Gastrointestinal (07/17/2019)  Esophagogastroduodenoscopy (07/17/2019)  Esophagogastroduodenoscopy, flexible, transoral; with biopsy, single or multiple (07/17/2019)  Excision of Esophagus, Via Natural or Artificial Opening Endoscopic, Diagnostic (07/17/2019)  Biopsy Gastrointestinal (07/18/2018)  Esophagogastroduodenoscopy (07/18/2018)  Esophagogastroduodenoscopy, flexible, transoral; with biopsy, single or multiple (07/18/2018)  Excision of Esophagogastric Junction, Via Natural or Artificial Opening Endoscopic (07/18/2018)  Esophagogastroduodenoscopy (06/15/2016)  Inspection of Upper Intestinal Tract, Via Natural or Artificial Opening Endoscopic (06/15/2016)  inguinal hernia repair (03.1982)   Medications  Compazine, 10 mg= 2 mL, IV Push, Once  ferrous gluconate 324 mg (38 mg elemental iron) oral tablet, 324 mg= 1 tab(s), Oral, Daily, 1 refills  hydrochlorothiazide-lisinopril 12.5 mg-20 mg oral tablet, 1 tab(s), Oral, Daily, 3 refills  Protonix 40 mg ORAL enteric coated tablet, 40 mg= 1 tab(s), Oral, Daily, 2 refills  Allergies  No Known Allergies  Social History  Abuse/Neglect  No, 10/28/2020  No, No, Yes, 10/10/2020  Alcohol  Current, Beer, Liquor, 1-2 times per week,  11/05/2020  Employment/School  Employed, 02/20/2020  Exercise  Exercise duration: 0., 02/20/2020  Home/Environment  Lives with Father, Mother. Living situation: Home/Independent., 02/20/2020  Nutrition/Health  Regular, 02/20/2020  Substance Use  Current, Marijuana, Daily, 11/05/2020  Tobacco  Former smoker, quit more than 30 days ago, No, 10/28/2020  Former smoker, quit more than 30 days ago, Cigarettes, N/A, 10/10/2020  Family History  Hyperlipidemia.: Mother (Dx at 45).  Hypertension.: Mother and Father.  Immunizations  Vaccine Date Status   influenza virus vaccine, inactivated 09/29/2020 Given   influenza virus vaccine, inactivated 02/20/2020 Given   influenza virus vaccine, inactivated 10/20/2018 Given   tetanus/diphtheria/pertussis, acel(Tdap) 05/17/2018 Given   influenza virus vaccine, inactivated 11/30/2016 Given   Health Maintenance  Health Maintenance  ???Pending?(in the next year)  ??? ??Due?  ??? ? ? ?Influenza Vaccine due??10/01/20??and every 1??day(s)  ??? ??Due In Future?  ??? ? ? ?Obesity Screening not due until??01/01/21??and every 1??year(s)  ??? ? ? ?Smoking Cessation not due until??01/01/21??and every 1??year(s)  ??? ? ? ?Alcohol Misuse Screening not due until??01/02/21??and every 1??year(s)  ??? ? ? ?Hypertension Maintenance-Medication Prescribed not due until??08/20/21??and every 1??year(s)  ??? ? ? ?Hypertension Management-Education not due until??09/29/21??and every 1??year(s)  ??? ? ? ?Hypertension Management-BMP not due until??10/28/21??and every 1??year(s)  ???Satisfied?(in the past 1 year)  ??? ??Satisfied?  ??? ? ? ?ADL Screening on??11/05/20.??Satisfied by Luz Keith  ??? ? ? ?Alcohol Misuse Screening on??02/20/20.??Satisfied by Angel Hall MD  ??? ? ? ?Blood Pressure Screening on??11/05/20.??Satisfied by Luz Keith  ??? ? ? ?Body Mass Index Check on??11/05/20.??Satisfied by Luz Keith  ??? ? ? ?Depression Screening on??11/05/20.??Satisfied by Luz Keith  ??? ? ?  ?Diabetes Screening on??10/28/20.??Satisfied by Shaun Downey  ??? ? ? ?Hypertension Management-Blood Pressure on??11/05/20.??Satisfied by Luz Keith  ??? ? ? ?Hypertension Management-BMP on??10/28/20.??Satisfied by Shaun Downey  ??? ? ? ?Hypertension Management-Education on??09/29/20.??Satisfied by Papa Jarrell DO  ??? ? ? ?Hypertension Maintenance-Medication Prescribed on??08/20/20.??Satisfied by Minh Sanchez  ??? ? ? ?Influenza Vaccine on??09/29/20.??Satisfied by Tiffanie Garcia LPN  ??? ? ? ?Lipid Screening on??02/20/20.??Satisfied by Aundrea Vizcaino  ??? ? ? ?Obesity Screening on??11/05/20.??Satisfied by Luz Keith  ??? ? ? ?Smoking Cessation on??08/26/20.??Satisfied by Jimmy Umanzor RN  ?  Diagnostic Results  (06/23/2020 20:59 CDT CT Abdomen and Pelvis W Contrast)  Reason For Exam  Abdominal Pain  ?  Radiology Report  EXAMINATION  CT Abdomen and Pelvis W Contrast  ?  TECHNIQUE  ?? ? Helical-acquisition CT images were obtained following the  intravenous administration of iodinated contrast media. Enteric  contrast was not utilized.  ?? ? Multiplanar reformats were accomplished by a CT technologist at a  separate workstation and pushed to PACS for physician review.  ?  Total DLP (mGy-cm): 1037.6  (value may include radiation due to concomitantly performed CT  imaging)  ?? ? Automated tube current modulation and/or weight-based exposure  dosing is utilized, when appropriate, to reach lowest reasonably  achievable exposure rate.  ?  INDICATION  Nausea, vomiting, diarrhea  ?  Comparison: 4 May, 2020  ?  FINDINGS  Images were reviewed in soft tissue, lung, and bone windows.  ?  Exam quality: adequate  ?  Lines/tubes: None visualized.  ?  Lower thoracic cavity  ?? ? Heart: No acute or focal abnormality.  ?? ? Lung bases: No acute or focal abnormality.  ?  Hepatobiliary  ?? ? Liver: No acute or suspicious focal abnormality. Small cystic  appearing irregularity at the inferior  margin of hepatic segment 5 is  unchanged.  ?? ? Gallbladder: No calcified stone, wall thickening, or  pericholecystic inflammation.  ?? ? Bile ducts: No convincing obstructive process.  ?  Pancreas: No acute or focal abnormality.  ?  Spleen: No acute or focal abnormality.  ?  Adrenal glands: No acute or focal abnormality.  ?  Genitourinary  ?? ? Kidneys/ureters: Left pelvic kidney is again noted. No new focal  renal lesion is identified. There is no complex cyst. No urolithiasis  or evidence of obstructive uropathy.  ?? ? Urinary bladder: No focal wall thickening or convincing  intraluminal abnormality.  ?? ? Prostate: Unremarkable for CT assessment.  ?  Gastrointestinal  ?? ? Esophagus: Circumferential mural thickening is appreciated at the  visualized distal colon, without focal asymmetry or other suspicious  discrete lesion.  ?? ? Stomach: There is relative prominent appearance of the rugal  folds, without focal intraluminal abnormality.  ?? ? Small bowel: No focal abnormality or suggestion of high-grade  obstruction.  ?? ? Appendix: No acute or focal abnormality.  ?? ? Large bowel: No acute or focal abnormality.  ?  Peritoneal cavity/Retroperitoneum: No free fluid or air.  ?  Lymph nodes: No pathologic enlargement or necrotic process.  ?  Vasculature: No acute or focal abnormality.  ?  Musculoskeletal  ?? ? Subcutaneous/musculature: Uncomplicated fat-containing umbilical  and bilateral inguinal hernias are similar in the interval. No new  focal body wall abnormality is identified.  ?? ? Osseous: There are degenerative changes of the spinal column and  bony pelvis. No acute osseous displacement or destructive focal lesion  is appreciated. Progressed healing changes of the nondisplaced right  9th rib fracture is incidentally mentioned.  ?  IMPRESSION  1. ?Circumferential thickening of the distal esophageal wall and  prominence of the gastric folds are nonspecific, but may reflect  sequela of  esophagitis/gastritis.  2. ?Otherwise, no evidence of acute or new focal abdominopelvic  process.  3. ?Chronic secondary details discussed above. [1] (10/10/2020 21:52 CDT CT Abdomen and Pelvis W/O Contrast)  Reason For Exam  Abdominal Pain  ?  Radiology Report  EXAMINATION  CT Abdomen and Pelvis W/O Contrast  ?  TECHNIQUE  Helical-acquisition CT images were obtained without the intravenous  administration of iodinated contrast media. Enteric contrast was not  utilized.  Multiplanar reformats were accomplished by a CT technologist at a  separate workstation and pushed to PACS for physician review.  ?  Automated tube current modulation and/or weight-based exposure dosing  is utilized, when appropriate, to reach lowest reasonably achievable  exposure rate.  ?  INDICATION  Persistent abdominal pain, nausea, vomiting  ?  Comparison: 23 June, 2020  ?  FINDINGS  Images were reviewed in soft tissue, lung, and bone windows.  ?  Exam quality: adequate  Overall assessment of the soft tissues and vasculature is limited in  the absence of contrast agent(s).  ?  Lines/Tubes: None visualized.  ?  Lower Thoracic Cavity  ?? ? Heart: Stable chamber size. No pericardial effusion.  ?? ? Lungs/Pleura: No new consolidation or suspicious lesion. No  worsening pleural effusion or evidence for loculation.  ?? ? Esophagus: Unremarkable.  ?  Hepatobiliary: No interval change.  Pancreas: No interval change.  ?  Spleen: No interval change.  ?  Adrenal glands: No interval change.  ?  Genitourinary: Left pelvic kidney is again visualized. No new focal  renal lesion, obstructive uropathy, or bladder abnormality.  Reproductive structures are unchanged.  ?  Stomach/Bowel: No evidence of gastric outlet or small bowel  obstruction. No acute inflammatory process or convincing focal lesion.  ?  Peritoneal cavity/Retroperitoneum: No free fluid or air. No drainable  collections.  Vasculature: No interval change.  Lymph nodes: No development of pathologic  enlargement or necrotic  process.  ?  Musculoskeletal: No interval change.  ?  IMPRESSION  1. ?No CT evidence of acute abdominopelvic process.  2. ?Chronic secondary details are similar in the interval.  ?  ?  No significant discrepancy identified in relation to the teleradiology  preliminary report. Additional details provided above.  ? [2]     [1]?CT Abdomen and Pelvis W Contrast; Yogi Lawrence MD 06/23/2020 20:59 CDT  [2]?CT Abdomen and Pelvis W/O Contrast; Yogi Lawrence MD 10/10/2020 21:52 CDT

## 2022-05-02 NOTE — HISTORICAL [facility]
This is a historical note converted from [place]. Formatting and pictures may have been removed.  Please reference [place] for original formatting and attached multimedia. Chief Complaint  39 male presents with ongoing hx of bilateral knee pain and ankle pain. ?Started with pain to left knee and ankle last week and this week he began with right knee pain and ankle pain.  History of Present Illness  40 yo male presents with c/o right knee and right ankle pain.? States last week it was left ankle and foot. Hx HTN.? Has ankle brace on currently.? redness noted to right ankle.? NO redness to right knee. 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  Review of Systems  Constitutional:?? denies fever, chills, fatigue, or weakness  Eye:?denies vision loss, eye redness, drainage, or pain; no double vision  ENMT:?denies sore throat, ear pain, sinus pain/congestion, nasal congestion/drainage  Respiratory:?denies cough, wheezing,?or shortness of breath  Cardiovascular:?denies chest pain, palpitations,?edema, syncope, pain in legs  Gastrointestinal:?denies nausea, vomiting, or diarrhea.?Denies abdominal pain, hematemesis, melena  Genitourinary:?denies dysuria,?urinary frequency or urgency,?denies hematuria  Hema/Lymph:?denies abnormal bruising or bleeding, denies lumps to neck or groin  Endocrine:?denies heat or cold intolerance,?denies excessive thirst or excessive urination  Musculoskeletal:?right  Integumentary:?denies skin rash or abnormal lesions  Neurologic: denies headache,?dizziness,?weakness or numbness; no confusion or recent memory changes  Physical Exam  Vitals & Measurements  T:?37.1? ?C (Oral)? HR:?108(Peripheral)? RR:?20? BP:?149/98? SpO2:?99%?  HT:?160?cm? WT:?98?kg?  General:? NADN, AAOx3  Knee:? pt is able to bear weight and ambulate?with pain  ?????No surface trauma, STS, or obvious  effusion  ?????No overlying erythema  ???? warmth to right knee  ?????The R knee is without obvious asymmetry or deformity when compared with?L knee  ?????Pt not able to do deep knee bends with symmetry (130 degrees), not able to fully extend knee, internal and external rotation (10 degrees)  ?????T to palpation of patella, no effusion or ballottement  ?????T over infrapatellar tendon?  ?????T over medial?and lateral joint line  ?????NT over proximal fibular head  ?????NT, fullness, or mass of the popliteal fossa  ?????No quadriceps tenderness  ?????No laxity of ACL, PCL, MCL, or LCL  ?????Negative?Lachman/Drawer sign  ?????Negative?Frank  ?????Negative?Apley compression and/or distraction  ?????Distal motor and N/V intact?  Assessment/Plan  1.?Bursitis of right knee?M70.51  XR right knee=no acute abnormalities, no bony lesions.  RICE  Toradol 60mg IM  Naproxen 500mg po BID x 10 days, start tomorrow  Ortho referral, knee exercises  FU as needed if swelling worsens and increased warmth/fever/pain  Ordered:  naproxen, 500 mg = 1 tab(s), Oral, BID, X 14 day(s), # 28 tab(s), 0 Refill(s), Pharmacy: [facility], 160, cm, Height/Length Dosing, 04/08/20 14:22:00 CDT, 98, kg, Weight Dosing, 04/08/20 14:22:00 CDT  injections IM/SQ, 04/08/20 15:32:00 CDT, 04/08/20 15:32:00 CDT, Bursitis of right knee  Bursitis of right ankle  Office/Outpatient Visit Level 4 Established [id number] PC, Bursitis of right knee  Bursitis of right ankle, 04/08/20 15:03:00 CDT  Office/Outpatient Visit Level 4 Established [id number] PC, Bursitis of right knee  Bursitis of right ankle, 04/08/20 15:45:00 CDT  ?  2.?Bursitis of right ankle?M77.51  RICE  Toradol injection 60mg IM  Naproxen 500mg po BID x 10 days, Start tomorrow  Ortho referral?  FU as needed.  Ordered:  naproxen, 500 mg = 1 tab(s), Oral, BID, X 14 day(s), # 28 tab(s), 0 Refill(s), Pharmacy: [facility], 160, cm, Height/Length Dosing, 04/08/20 14:22:00 CDT, 98, kg, Weight  Dosing, 04/08/20 14:22:00 CDT  injections IM/SQ, 04/08/20 15:32:00 CDT, 04/08/20 15:32:00 CDT, Bursitis of right knee  Bursitis of right ankle  Office/Outpatient Visit Level 4 Established [id number] PC, Bursitis of right knee  Bursitis of right ankle, 04/08/20 15:03:00 CDT  Office/Outpatient Visit Level 4 Established [id number] PC, Bursitis of right knee  Bursitis of right ankle, 04/08/20 15:45:00 CDT  ?  Referrals  [facility] Internal Referral to Orthopedics Clinic, Specialty: Sports Medicine, Reason: OA, bursitis of right knee, Start: 04/08/20 15:33:00 CDT   Problem List/Past Medical History  Ongoing  Cannabis hyperemesis syndrome co-occurrent and due to cannabis abuse  GERD - Gastro-esophageal reflux disease  Hypertension  Obesity  Vomiting  Historical  Hypertension  Procedure/Surgical History  Biopsy Gastrointestinal (07/17/2019)  Esophagogastroduodenoscopy (07/17/2019)  Esophagogastroduodenoscopy, flexible, transoral; with biopsy, single or multiple (07/17/2019)  Excision of Esophagus, Via Natural or Artificial Opening Endoscopic, Diagnostic (07/17/2019)  Biopsy Gastrointestinal (07/18/2018)  Esophagogastroduodenoscopy (07/18/2018)  Esophagogastroduodenoscopy, flexible, transoral; with biopsy, single or multiple (07/18/2018)  Excision of Esophagogastric Junction, Via Natural or Artificial Opening Endoscopic (07/18/2018)  Esophagogastroduodenoscopy (06/15/2016)  Inspection of Upper Intestinal Tract, Via Natural or Artificial Opening Endoscopic (06/15/2016)  inguinal hernia repair (03.1982)   Medications  Compazine, 10 mg= 2 mL, IV Push, Once  lisinopril 20 mg oral tablet, 20 mg= 1 tab(s), Oral, Daily, 5 refills  mirtazapine 15 mg oral tablet, 15 mg= 1 tab(s), Oral, qPM,? ?Not taking  naproxen 500 mg oral tablet, 500 mg= 1 tab(s), Oral, BID  Protonix 40 mg ORAL enteric coated tablet, 40 mg= 1 tab(s), Oral, Daily, 7 refills  Zofran ODT 4 mg oral tablet, disintegrating, 4 mg= 1 tab(s), Oral, q8hr, PRN,? ?Not Taking, Completed  Rx  Allergies  No Known Allergies  Social History  Abuse/Neglect  No, 04/08/2020  Alcohol  Current, 1-2 times per week, 02/20/2020  Employment/School  Employed, 02/20/2020  Exercise  Exercise duration: 0., 02/20/2020  Home/Environment  Lives with Father, Mother. Living situation: Home/Independent., 02/20/2020  Nutrition/Health  Regular, 02/20/2020  Tobacco  Former smoker, quit more than 30 days ago, No, 04/08/2020  Family History  Hyperlipidemia.: Mother (Dx at 45).  Hypertension.: Mother and Father.  Immunizations  Vaccine Date Status   influenza virus vaccine, inactivated 02/20/2020 Given   influenza virus vaccine, inactivated 10/20/2018 Given   tetanus/diphtheria/pertussis, acel(Tdap) 05/17/2018 Given   influenza virus vaccine, inactivated 11/30/2016 Given   Health Maintenance  Health Maintenance  ???Pending?(in the next year)  ??? ??OverDue  ??? ? ? ?Diabetes Screening due??and every?  ??? ??Due In Future?  ??? ? ? ?Hypertension Management-Education not due until??05/28/20??and every 1??year(s)  ??? ? ? ?ADL Screening not due until??05/28/20??and every 1??year(s)  ??? ? ? ?Alcohol Misuse Screening not due until??01/01/21??and every 1??year(s)  ??? ? ? ?Obesity Screening not due until??01/01/21??and every 1??year(s)  ??? ? ? ?Depression Screening not due until??02/19/21??and every 1??year(s)  ??? ? ? ?Hypertension Management-BMP not due until??03/20/21??and every 1??year(s)  ???Satisfied?(in the past 1 year)  ??? ??Satisfied?  ??? ? ? ?ADL Screening on??05/28/19.??Satisfied by Lin Hawkins LPN  ??? ? ? ?Alcohol Misuse Screening on??02/20/20.??Satisfied by Rafael MIRZA, Angel Billings  ??? ? ? ?Blood Pressure Screening on??04/08/20.??Satisfied by Stephenie Connell LPN  ??? ? ? ?Body Mass Index Check on??03/19/20.??Satisfied by Castro Tovar RN, Akash  ??? ? ? ?Depression Screening on??02/20/20.??Satisfied by Angel Hall MD  ??? ? ? ?Diabetes Screening on??03/20/20.??Satisfied by William Goodson  ??? ? ?  ?Hypertension Management-Blood Pressure on??04/08/20.??Satisfied by Stephenie Connell LPN  ??? ? ? ?Hypertension Management-BMP on??03/20/20.??Satisfied by William Goodson  ??? ? ? ?Hypertension Management-Education on??05/28/19.??Satisfied by Angel Hall MD??Reason: Expectation Satisfied Elsewhere  ??? ? ? ?Influenza Vaccine on??02/20/20.??Satisfied by Asiya FRIED, Papa Chan  ??? ? ? ?Lipid Screening on??02/20/20.??Satisfied by Aundrea Vizcaino  ??? ? ? ?Obesity Screening on??03/20/20.??Satisfied by Aurora Olvera RN  ?  (04/08/2020 15:20 CDT XR Knee Right 3 Views)  ?  Reason For Exam  r/o fx, knee pain and swelling right;Pain  ?  Radiology Report  Right knee 3 views  ?  Clinical history is pain  ?  There are no fractures seen. There is no dislocation. There are no  bony lesions noted.  ?  IMPRESSION: No abnormalities are seen [1]     [1]?XR Knee Right 3 Views; Amari MIRZA, Jarvis MORALES 04/08/2020 15:20 CDT

## 2022-05-02 NOTE — HISTORICAL [facility]
This is a historical note converted from [place]. Formatting and pictures may have been removed.  Please reference [place] for original formatting and attached multimedia. Chief Complaint  Protonix refill.  History of Present Illness  ?  Patient is a 37 year old WM with a pMHx of HTN, Hx of Marijuana abuse with associated cannabinoid hyperemesis syndrome, and Hx of upper GI bleed in 2016 who presents to the IM clinic for a follow up.  ?   -Patient was admitted to the hospital in June 2016 and EGD showed distal esophagitis with erosions, small sliding hiatal hernia, no gastric outlet obstruction or pyloric abnormalities. Patient reports they recommended him to follow up with EGD in 2 years. EGD was followed up in August 2018 which showed 1. 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 2 cm hiatal hernia. Biopsy showed gastroesophageal junctional mucosa with mild chronic gastritis and no Helicobacter-like organisms identified on Diff-Quik stains with appropriate controls reviewed. Patient had a repeat EGD in July 2019 which didnt show any Khan esophagus. Patient takes Protonix daily.  -Patient reports having seen Psychiatry for anxiety/marijuana use. Patient now doesnt smoke marijuana since September.  -Patient reports daytime sleepiness/fatigue and snoring. Patient is obese.?Bicarb elevated in the past.  ?   Patient today?has no active complaints, reports he is doing well.?Patient currently denies any chest pain, SOB, cough, abdominal pain, diarrhea/constipation, dysuria, f/c, or bleeding. Patient has a distant history of tobacco use where he quit in year of 2009. Patient drinks occasionally. VSS.?  Review of Systems  10 pt?review of systems complete and are normal except as listed above.  Physical Exam  Vitals & Measurements  T:?36.6? ?C (Oral)? HR:?87(Peripheral)? RR:?18? BP:?130/89?  HT:?160?cm? WT:?100.9?kg? BMI:?39.41?  General: Alert and oriented x 3, in no acute distress.  HEENT:  Normocephalic, atraumatic.  Neck: Supple. No lymphadenopathy or thyromegaly.  Chest: Clear to auscultate bilaterally. No wheezing or?rhonchi.  Heart: S1 and?S2 present. Regular rate and rhythm. No murmurs/gallops/regurgitations.  Abdomen: Obese. Soft, nontender, and nondistended. Normal active bowel sounds.  Extremities: No cyanosis, clubbing or edema.  Neurologic: No focal deficit. No sensory deficit.  Integumentary: Moist mucous membranes.?Good skin turgor, intact.  Psychiatric: Appropriate affect.  Assessment/Plan  ?  HTN  -Blood pressure today borderline controlled but patient reports he just took lisinopril 1 hour prior  -Education performed  -Continue with Lisinopril 20 mg daily  ?  GERD  -EGD in July 2019 did not show Barretts  -Continue with Protonix  -Will prescribe H2 blocker PRN  -Continue to monitor for symptoms  ?   CKD  -Likely 2/2 HTN  -Will check for Diabetes  -Avoid NSAIDs  -Labs today  ?   Suspected CAT/OHS  -Patient reports daytime sleepiness/fatigue and snoring. Patient is obese.?Bicarb elevated in the past  -Educated and will refer to sleep study  -Will obtain labs including ABG  ?   Screening  -Hgb A1c 5.2 in June 2016  -Normal TSH in July 2016  -Recent CMP, CBC, and FLP reviewed  -Tetanus vaccine in May 2018  -CAGE negative  -Flu shot today  ?   Will check labs  Refilled meds  ?  RTC in?2 months   Problem List/Past Medical History  Ongoing  Cannabis hyperemesis syndrome co-occurrent and due to cannabis abuse  GERD - Gastro-esophageal reflux disease  Hypertension  Obesity  Vomiting  Historical  Hypertension  Procedure/Surgical History  Biopsy Gastrointestinal (07/17/2019)  Esophagogastroduodenoscopy (07/17/2019)  Esophagogastroduodenoscopy, flexible, transoral; with biopsy, single or multiple (07/17/2019)  Excision of Esophagus, Via Natural or Artificial Opening Endoscopic, Diagnostic (07/17/2019)  Biopsy Gastrointestinal (07/18/2018)  Esophagogastroduodenoscopy  (07/18/2018)  Esophagogastroduodenoscopy, flexible, transoral; with biopsy, single or multiple (07/18/2018)  Excision of Esophagogastric Junction, Via Natural or Artificial Opening Endoscopic (07/18/2018)  Esophagogastroduodenoscopy (06/15/2016)  Inspection of Upper Intestinal Tract, Via Natural or Artificial Opening Endoscopic (06/15/2016)  inguinal hernia repair (03.1982)   Medications  Compazine, 10 mg= 2 mL, IV Push, Once  lisinopril 20 mg oral tablet, 20 mg= 1 tab(s), Oral, Daily, 5 refills  metoclopramide 10 mg oral tablet, 10 mg= 1 tab(s), Oral, q6hr,? ?Not taking  Protonix 40 mg ORAL enteric coated tablet, 40 mg= 1 tab(s), Oral, Daily, 7 refills  Allergies  No Known Allergies  Social History  Abuse/Neglect  No, 02/20/2020  Alcohol  Current, 1-2 times per week, 02/20/2020  Employment/School  Employed, 02/20/2020  Exercise  Exercise duration: 0., 02/20/2020  Home/Environment  Lives with Father, Mother. Living situation: Home/Independent., 02/20/2020  Nutrition/Health  Regular, 02/20/2020  Substance Use  Past, Marijuana, 1-2 times per week, 02/20/2020  Tobacco  Former smoker, quit more than 30 days ago, Cigarettes, N/A, 02/20/2020  Family History  Hyperlipidemia.: Mother (Dx at 45).  Hypertension.: Mother and Father.  Immunizations  Vaccine Date Status   influenza virus vaccine, inactivated 02/20/2020 Given   influenza virus vaccine, inactivated 10/20/2018 Given   tetanus/diphtheria/pertussis, acel(Tdap) 05/17/2018 Given   influenza virus vaccine, inactivated 11/30/2016 Given   Health Maintenance  Health Maintenance  ???Pending?(in the next year)  ??? ??OverDue  ??? ? ? ?Diabetes Screening due??and every?  ??? ??Due In Future?  ??? ? ? ?Hypertension Management-Education not due until??05/28/20??and every 1??year(s)  ??? ? ? ?ADL Screening not due until??05/28/20??and every 1??year(s)  ??? ? ? ?Hypertension Management-BMP not due until??08/14/20??and every 1??year(s)  ??? ? ? ?Alcohol Misuse Screening not due  until??01/01/21??and every 1??year(s)  ??? ? ? ?Obesity Screening not due until??01/01/21??and every 1??year(s)  ??? ? ? ?Blood Pressure Screening not due until??02/19/21??and every 1??year(s)  ??? ? ? ?Body Mass Index Check not due until??02/19/21??and every 1??year(s)  ??? ? ? ?Depression Screening not due until??02/19/21??and every 1??year(s)  ??? ? ? ?Hypertension Management-Blood Pressure not due until??02/19/21??and every 1??year(s)  ???Satisfied?(in the past 1 year)  ??? ??Satisfied?  ??? ? ? ?ADL Screening on??05/28/19.??Satisfied by Lin Hawkins LPN  ??? ? ? ?Alcohol Misuse Screening on??02/20/20.??Satisfied by Angel Hall MD  ??? ? ? ?Blood Pressure Screening on??02/20/20.??Satisfied by Papa Braden RN  ??? ? ? ?Body Mass Index Check on??02/20/20.??Satisfied by Papa Braden RN  ??? ? ? ?Depression Screening on??02/20/20.??Satisfied by Angel Hall MD  ??? ? ? ?Diabetes Screening on??08/15/19.??Satisfied by Karolina Broussard  ??? ? ? ?Hypertension Management-Blood Pressure on??02/20/20.??Satisfied by Papa Braden RN  ??? ? ? ?Hypertension Management-BMP on??08/15/19.??Satisfied by Karolina Broussard  ??? ? ? ?Hypertension Management-Education on??05/28/19.??Satisfied by Angel Hall MD??Reason: Expectation Satisfied Elsewhere  ??? ? ? ?Influenza Vaccine on??02/20/20.??Satisfied by Papa Braden RN  ??? ? ? ?Obesity Screening on??02/20/20.??Satisfied by Asiya RN, Papa Chan  ?      Patient seen and examined,?reviewed with the resident,?agree with?assessment?and?plan.

## 2022-05-02 NOTE — HISTORICAL [facility]
This is a historical note converted from [place]. Formatting and pictures may have been removed.  Please reference [place] for original formatting and attached multimedia. Chief Complaint  GERD  History of Present Illness  38 yoM presents for f/u acid reflux. He had an EGD performed 1 year ago that showed abnormal appearing salmon colored mucosa at the GE junction, biopsies negative for Barretts. Since then he has been on protonix, nexium, and tums as needed. He does have daily reflux symptoms including retrosternal burning pain worse with certain foods and how active he is. Denies weight loss, dysphagia, bloody or melanotic stools.  Review of Systems  Negative except as per HPI  Physical Exam  Vitals & Measurements  T:?37.2? ?C (Oral)? HR:?84(Peripheral)? RR:?18? BP:?134/92? SpO2:?98%? WT:?89.37?kg?  General:?WD/WN, NAD, AAO x 3  Neck: supple, full ROM  Respiratory:?normal WOB on room air, LCTAB  CV:?RRR no m/r/g  ABD:?soft, nt/nd  Neurologic: cranial nerves 2-12 grossly?intact  Ext: no c/c/e  ?  Assessment/Plan  38 yoM with chronic GERD  -proceed with EGD as scheduled   Problem List/Past Medical History  Ongoing  Cannabis hyperemesis syndrome co-occurrent and due to cannabis abuse  Hypertension  Obesity  Historical  Hypertension  Procedure/Surgical History  Biopsy Gastrointestinal (07/18/2018)  Esophagogastroduodenoscopy (07/18/2018)  Esophagogastroduodenoscopy, flexible, transoral; with biopsy, single or multiple (07/18/2018)  Excision of Esophagogastric Junction, Via Natural or Artificial Opening Endoscopic (07/18/2018)  Esophagogastroduodenoscopy (06/15/2016)  Inspection of Upper Intestinal Tract, Via Natural or Artificial Opening Endoscopic (06/15/2016)  inguinal hernia repair (03.1982)   Medications  Inpatient  buffered lidocaine 2% - 0.5 ml syringe, 10 mg= 0.5 mL, Subcutaneous, As Directed  Compazine, 10 mg= 2 mL, IV Push, Once  [id number] 1,000 mL, 1000 mL, IV  Home  Bentyl 10 mg oral capsule, 10 mg= 1 cap(s),  Oral, QID, PRN  hydrOXYzine hydrochloride 25 mg oral tablet, 25 mg= 1 tab(s), Oral, QID, PRN, 2 refills  lisinopril 20 mg oral tablet, 20 mg= 1 tab(s), Oral, Daily, 5 refills  Protonix 40 mg ORAL enteric coated tablet, 40 mg= 1 tab(s), Oral, Daily, 7 refills  Zofran ODT 4 mg oral tablet, disintegrating, 4 mg= 1 tab(s), Oral, q8hr, PRN  Allergies  No Known Allergies  Social History  Abuse/Neglect  No, 07/17/2019  Alcohol  Current, Beer, 1-2 times per week, Alcohol use interferes with work or home: No. Drinks more than intended: No. Others hurt by drinking: No. Ready to change: No. Household alcohol concerns: No., 10/18/2018  Employment/School  Employed, Work/School description: [employer]. Workplace hazards: Heavy lifting/twisting., 05/17/2018  Exercise  Self assessment: Good condition., 07/27/2016  Home/Environment  Lives with Father, Mother. Living situation: Home/Independent. Alcohol abuse in household: No. Substance abuse in household: No. Smoker in household: No., 07/27/2016  Nutrition/Health  Regular, Caffeine intake amount: 1-2 CUPS TEA IN AM. Sleeping concerns: No. Feels highly stressed: No., 07/27/2016  Substance Use  Past, Marijuana, 3-5 times per week, 05/28/2019  Tobacco  Former smoker, quit more than 30 days ago, No, 07/17/2019  Former smoker, quit more than 30 days ago, N/A, 05/27/2019  Family History  Hyperlipidemia.: Mother (Dx at 45).  Hypertension.: Mother and Father.  Immunizations  Vaccine Date Status   influenza virus vaccine, inactivated 10/20/2018 Given   tetanus/diphtheria/pertussis, acel(Tdap) 05/17/2018 Given   influenza virus vaccine, inactivated 11/30/2016 Given       He reports fairly persistent heartburn symptoms. ?Denies significant reflux. ?Denies any esophageal dysphagia.? He takes Protonix once daily but also uses?Nexium on top of it.? He has regular bowel movements on any rectal bleeding or melena.? He has had multiple emergency department visits for nausea and vomiting which has been  attributed to habitual marijuana use.? He had an EGD with Dr. Mas in?July 2018. ?This showed possible salmon-colored mucosa at the GE junction. ?Biopsies were negative for Barretts. ?He had a 2 cm hiatal hernia.? He was scheduled for repeat endoscopy in a year which he is here for today.

## 2022-05-02 NOTE — HISTORICAL [facility]
This is a historical note converted from [place]. Formatting and pictures may have been removed.  Please reference [employer] for original formatting and attached multimedia. Admit and Discharge Dates  Admit Date: 06/23/2020  Discharge Date: 06/25/2020  Physicians  Attending Physician - Shar MIRZA, Alicia BROOKE  Admitting Physician - Shar MIRZA, Alicia BROOKE  Consulting Physician - Júnior MIRZA, Ramila BROOKE  Primary Care Physician - Rafael MIRZA, Angel Billings  Discharge Diagnosis  Acute gastritis?K29.00  Cannabinoid hyperemesis syndrome?R11.2,?Intractable nausea and vomiting?R11.2  Diarrhea?[id number]  Hematemesis?K92.0  Leukocytosis?D72.829  Vomiting?[id number]  Surgical Procedures  No procedures recorded for this visit.  Immunizations  No immunizations recorded for this visit.  Admission Information  Mr. Pinzon is a 39-year-old  male with PMH of cannabinoid hyperemesis syndrome, HTN, and GERD presents again to the ED because he had blood streaked vomit x1. Patient has ongoing nausea and vomiting since 3 days ago. Patient last smoke marijuana Sunday night, 3 nights ago. Patient understand that his cannabis abuse is the reason for his nausea and vomiting, but he cannot quit. Patient reports hot shower and topical capsaicin does help relieve his symptoms. However the topical burns after a while. Also Thorazine works really well for his nausea and vomiting. Also he had a few episodes of nonbloody diarrhea a day ago with mild abdominal cramping.  ?  FH: Mother: HLD, HTN; Father: HTN  PSH/Procedures: EGD in 2016 was concerning for Barretts esophagus, repeat EGD in 2018 and 2019 was non-concerning according to patient  Social history: Smoke marijuana on a regular basis, drink 2-3 beers on the weekend, sometimes do mushrooms, have not done LSD in a while, denies smoking cigarettes, denies vaping, works as a   Allergies: NKDA  ?  In the ED, tachycardic (107) hypertensive  (140s-160s/90s-100s), otherwise afebrile and satting 99% on room air. CBC notable for leukocytosis of 16.4 an increased from 11.2 from 10 hours ago. UDS positive for cannabis. CMP and UA unremarkable. COVID negative. CT abdomen and pelvis with contrast shows circumferential thickening of the distal esophageal wall and prominence of the gastric folds. Patient was given Thorazine and 500 mL bolus of LR.this a.m. patient was given clonidine, lisinopril, Phenergan, Zofran and 1 L bolus of LR. IM was consulted for intractable nausea and vomiting secondary to cannabinoid hyperemesis syndrome, hematemesis, leukocytosis, and acute gastritis.  ?  Hospital Course  The patient was admitted in the hospital for the concern of Debbie-Flynn tear, acute gastritis/esophagitis, cannabinoid hyperemesis syndrome, chronic GERD and hypertension. CT scan abdomen showed circumferential thickening of the distal esophageal wall and prominence of gastric folds. UDS was positive for cannabinoids patient was kept n.p.o. Protonix was started and GI was consulted. Per GI he underwent EGD with Dr. Beckman on Ml 15/2016 secondary to coffee-ground emesis. EGD showed esophagitis and small sliding hiatal hernia. He underwent EGD for the second time with Dr. Mas on July 18/2018 secondary to acid reflux with the findings of salmon-colored mucosa concerning of Barretts esophagus and 2 cm hiatal hernia. Pathology revealed mild chronic gastritis and no H. pylori organisms. He underwent EGD with Dr. Callejas on July 17/2019 secondary to heartburn with findings of GE junction at 32 cm, hiatus at 32 cm, 2 cm hiatal hernia, irregular Z line at 32 cm, normal stomach with no abnormalities seen, normal bulb and second portion of the duodenum. Pathology revealed squamocolumnar junction mucosa with reactive epithelial changes, lamina propria fibrosis, chronic inflammation and intraepithelial eosinophils, consistent with GERD, no goblet cells identified, no  dysplasia identified. He denied having a colonoscopy done. He takes ibuprofen a few days per week and denies of use of blood thinners. He drinks 5 alcohol per week (whiskey, beer). He smokes marijuana daily. Denies of history of IBD, colon polyps and colon cancer. GI recommended no EGD at this time required. Can consider EGD outpatient if the patients symptom worsen or persists. We continued Protonix 40 mg daily and started him on sucralfate 1 g before meals and at bedtime for 2 weeks. Iron profile was checked.? Patient was found to have?iron deficiency anemia with ?MCV of 81, H&H 12.1/40.2  Iron profile shows-serum iron 31, transferrin 291, iron saturation 8, ferritin 25.3.  ?  During discharge patient was in stable condition with no hematemesis during hospital stay.  ?   He is being discharged on  Protonix 40 mg once daily  Sucralfate 1 g before meals and at bedtime for 2 weeks  For iron def anemia - Ferrous gluconate, ?Colace for constipation 100 mg twice daily?as needed constipation,?recommended?to take with?vitamin C  GERD lifestyle modifications  Avoid NSAIDs, cessation of cannabinoid use, tobacco and alcohol cessation.  Hypertension-continue his home dose of lisinopril 20 mg daily  Case management was consulted who provided him with a packet of information regarding the resources and statewide inpatient/outpatient substance abuse programs.  Patient is recommended to follow-up with the PCP in 2 to 3 weeks.  ?  Significant Findings  Labs Last 24 Hours?  ?Chemistry? Hematology/Coagulation?   Sodium Lvl:?135 mmol/L?Low (06/25/20 07:47:00) WBC:?11.1 x10(3)/mcL?High (06/25/20 07:47:00)   Potassium Lvl:?3.4 mmol/L?Low (06/25/20 07:47:00) RBC: 4.96 x10(6)/mcL (06/25/20 07:47:00)   Chloride:?97 mmol/L?Low (06/25/20 07:47:00) Hgb:?12.1 gm/dL?Low (06/25/20 07:47:00)   CO2: 29 mmol/L (06/25/20 07:47:00) Hct: 40.2 % (06/25/20 07:47:00)   Calcium Lvl: 9.1 mg/dL (06/25/20 07:47:00) Platelet: 344 x10(3)/mcL (06/25/20  07:47:00)   Glucose Lvl: 88 mg/dL (06/25/20 07:47:00) MCV: 81 fL (06/25/20 07:47:00)   BUN: 13 mg/dL (06/25/20 07:47:00) MCH:?24.4 pg?Low (06/25/20 07:47:00)   Creatinine: 1.1 mg/dL (06/25/20 07:47:00) MCHC:?30.1 gm/dL?Low (06/25/20 07:47:00)   eGFR-AA: 96 mL/min (06/25/20 07:47:00) RDW:?15.9 %?High (06/25/20 07:47:00)   eGFR-VEL:?80 mL/min?Low (06/25/20 07:47:00) MPV:?10.5 fL?High (06/25/20 07:47:00)   Bili Total: 0.4 mg/dL (06/25/20 07:47:00) Abs Neut: 6.45 x10(3)/mcL (06/25/20 07:47:00)   Bili Direct: 0.1 mg/dL (06/25/20 07:47:00) Neutro Auto: 58 % (06/25/20 07:47:00)   Bili Indirect: 0.3 mg/dL (06/25/20 07:47:00) Lymph Auto: 33 % (06/25/20 07:47:00)   AST: 21 unit/L (06/25/20 07:47:00) Mono Auto: 7 % (06/25/20 07:47:00)   ALT: 28 unit/L (06/25/20 07:47:00) Eos Auto: 0 % (06/25/20 07:47:00)   Alk Phos: 82 unit/L (06/25/20 07:47:00) Abs Eos: 0.1 x10(3)/mcL (06/25/20 07:47:00)   Total Protein:?8.3 gm/dL?High (06/25/20 07:47:00) Basophil Auto: 0 % (06/25/20 07:47:00)   Albumin Lvl: 3.6 gm/dL (06/25/20 07:47:00) Abs Neutro: 6.45 x10(3)/mcL (06/25/20 07:47:00)   Globulin:?4.7 gm/mL?High (06/25/20 07:47:00) Abs Lymph: 3.6 x10(3)/mcL (06/25/20 07:47:00)   A/G Ratio:?0.8 ratio?Low (06/25/20 07:47:00) Abs Mono: 0.8 x10(3)/mcL (06/25/20 07:47:00)   Iron Lvl:?32 mcg/dL?Low (06/25/20 07:47:00) Abs Baso: 0.1 x10(3)/mcL (06/25/20 07:47:00)   Iron Lvl:?31 mcg/dL?Low (06/25/20 07:47:00) IG%: 0 % (06/25/20 07:47:00)   Transferrin: 291 mg/dL (06/25/20 07:47:00) IG#: 0.05 (06/25/20 07:47:00)   TIBC: 394 mcg/dL (06/25/20 07:47:00) Retic Cnt Auto:?1.9 %?High (06/25/20 07:47:00)   Iron Sat:?8.1 %?Low (06/25/20 07:47:00) RET#: 0.09 x10(6)/mcL (06/25/20 07:47:00)   Ferritin Lvl:?25.3 ng/mL?Low (06/25/20 07:47:00)    ?Accession:?[id number]  Order:?XR Chest 2 Views  Report Dt/Tm:?06/24/2020 09:14  Report:?  XR Chest 2 Views  ?  REASON FOR STUDY: Hemoptysis  ?  COMPARISON: Chest one view 03/19/2020  ?  FINDINGS: The bilateral lungs are  clear and unremarkable. No  pneumothorax or pleural fluid. The heart and mediastinum are  unremarkable. The osseous structures are unremarkable.  ?  ?  IMPRESSION: As above.  ?  ?  Accession:?[id number]  Order:?CT Abdomen and Pelvis W Contrast  Report Dt/Tm:?06/23/2020 21:04  Report:?  EXAMINATION  CT Abdomen and Pelvis W Contrast  ?  TECHNIQUE  ?? ? Helical-acquisition CT images were obtained following the  intravenous administration of iodinated contrast media. Enteric  contrast was not utilized.  ?? ? Multiplanar reformats were accomplished by a CT technologist at a  separate workstation and pushed to PACS for physician review.  ?  Total DLP (mGy-cm): 1037.6  (value may include radiation due to concomitantly performed CT  imaging)  ?? ? Automated tube current modulation and/or weight-based exposure  dosing is utilized, when appropriate, to reach lowest reasonably  achievable exposure rate.  ?  INDICATION  Nausea, vomiting, diarrhea  ?  Comparison: 4 May, 2020  ?  FINDINGS  Images were reviewed in soft tissue, lung, and bone windows.  ?  Exam quality: adequate  ?  Lines/tubes: None visualized.  ?  Lower thoracic cavity  ?? ? Heart: No acute or focal abnormality.  ?? ? Lung bases: No acute or focal abnormality.  ?  Hepatobiliary  ?? ? Liver: No acute or suspicious focal abnormality. Small cystic  appearing irregularity at the inferior margin of hepatic segment 5 is  unchanged.  ?? ? Gallbladder: No calcified stone, wall thickening, or  pericholecystic inflammation.  ?? ? Bile ducts: No convincing obstructive process.  ?  Pancreas: No acute or focal abnormality.  ?  Spleen: No acute or focal abnormality.  ?  Adrenal glands: No acute or focal abnormality.  ?  Genitourinary  ?? ? Kidneys/ureters: Left pelvic kidney is again noted. No new focal  renal lesion is identified. There is no complex cyst. No urolithiasis  or evidence of obstructive uropathy.  ?? ? Urinary bladder: No focal wall thickening or  convincing  intraluminal abnormality.  ?? ? Prostate: Unremarkable for CT assessment.  ?  Gastrointestinal  ?? ? Esophagus: Circumferential mural thickening is appreciated at the  visualized distal colon, without focal asymmetry or other suspicious  discrete lesion.  ?? ? Stomach: There is relative prominent appearance of the rugal  folds, without focal intraluminal abnormality.  ?? ? Small bowel: No focal abnormality or suggestion of high-grade  obstruction.  ?? ? Appendix: No acute or focal abnormality.  ?? ? Large bowel: No acute or focal abnormality.  ?  Peritoneal cavity/Retroperitoneum: No free fluid or air.  ?  Lymph nodes: No pathologic enlargement or necrotic process.  ?  Vasculature: No acute or focal abnormality.  ?  Musculoskeletal  ?? ? Subcutaneous/musculature: Uncomplicated fat-containing umbilical  and bilateral inguinal hernias are similar in the interval. No new  focal body wall abnormality is identified.  ?? ? Osseous: There are degenerative changes of the spinal column and  bony pelvis. No acute osseous displacement or destructive focal lesion  is appreciated. Progressed healing changes of the nondisplaced right  9th rib fracture is incidentally mentioned.  ?  IMPRESSION  1. ?Circumferential thickening of the distal esophageal wall and  prominence of the gastric folds are nonspecific, but may reflect  sequela of esophagitis/gastritis.  2. ?Otherwise, no evidence of acute or new focal abdominopelvic  process.  3. ?Chronic secondary details discussed above.  ?  Time Spent on discharge  30 min  Objective  Vitals & Measurements  T:?36.7? ?C (Oral)? TMIN:?36.4? ?C (Oral)? TMAX:?37.4? ?C (Oral)? HR:?106(Peripheral)? RR:?18? BP:?163/106? SpO2:?98%?  Physical Exam  General: ?No acute distress. ?  Eye: ?Normal conjunctiva. ?  HENT: ?Oral mucosa is moist, No pharyngeal erythema. ?  Respiratory: ?Lungs are clear to auscultation, Respirations are non-labored, Breath sounds are equal, Symmetrical chest wall  expansion, No chest wall tenderness. ?  Cardiovascular: ?Normal rate, Regular rhythm, No murmur, No gallop, Good pulses equal in all extremities, Normal peripheral perfusion, No edema. ?  Gastrointestinal: ?Soft, Non-tender, Non-distended, Normal bowel sounds. ?  Musculoskeletal: ?No deformity. ?  Integumentary: ?Warm, Dry. ?  Neurologic: ?Alert, Oriented, No focal deficits. ?  Cognition and Speech: ?Speech clear and coherent. ?  Psychiatric: ?Cooperative. ?  ?  Patient Discharge Condition  stable  Discharge Disposition  home   Discharge Medication Reconciliation  Discharge Med Rec is not complete      I was present with the Resident during discharge day management.  ???  [x ] I discussed the case with the Resident and agree with the discharge plan as above.  [ ] I discussed the case with the Resident and agree with the discharge plan as above except:  ???  Time spent on discharge [35 ] minutes

## 2022-05-02 NOTE — HISTORICAL [facility]
This is a historical note converted from [employer]. Formatting and pictures may have been removed.  Please reference [employer] for original formatting and attached multimedia. Admit and Discharge Dates  Admit Date: 12/27/2020  Discharge Date: 12/28/2020  Physicians  Attending Physician - Shar MIRZA, Alicia  Admitting Physician - Iam MIRZA, Sally TOBIN  Primary Care Physician - Papa Jarrell DO  Discharge Diagnosis  Cannabis hyperemesis syndrome with concurrent cannabis abuse  JORJE  GERD  Hypertension  Surgical Procedures  No procedures recorded for this visit.  Immunizations  No immunizations recorded for this visit.  Admission Information  Patient is a?40-year-old  male with PMH?of?cannabinoid hyperemesis syndrome,?marijuana abuse,?GERD, HTN,?and?obesity?presenting to [facility]?with complaints of?N/V,?diarrhea,?abdominal pain, and cough?for the past 3 days.??Patient was admitted for management of?JORJE and?electrolyte derangements secondary to dehydration and emesis.  Significant Findings  Patient has had multiple admissions for Cannabis hyperemesis syndrome with concurrent cannabis abuse. Patient currently denying?cannabis use despite?positive UDS. He was?given Zofran 4 mg prn and Bentyl with relief of symptoms nausea and emesis cessation. Admitted for JORJE (BUN 57.0 and serum Creatinine 4.72 on admission), which began to quickly resolve with IV fluid resuscitation.. Retroperitoneal U/S ordered to r/o hydronephrosis given significant increase in serum creatinine and was found to have no abnormalities, has one renal pelvic.  Patient presented with several electrolyte abnormalities including:Hyponatremia, hypokalemia, hypochloremia, hypercalcemia, hyperphosphatemia that corrected with once dose of KCl 40 mEq PO and 4L IVF (2L LR, 2L NS) Leukocytosis and Reactive thrombocytosis were suspected to be due to hemoconcentration from dehydration and appropriately?resolved within?one day with fluid resuscitation. On day 2 patient  was tolerating solids and liquids with complete resolution of nausea and emesis with Zofran. He was discharged and told to follow up in post-wards clinic in 1-2 weeks for assessment of kidney function and resolution of recent JORJE.  Time Spent on discharge  >35 minutes  Objective  Vitals & Measurements  T:?36.9? ?C (Oral)? TMIN:?36.6? ?C (Oral)? TMAX:?36.9? ?C (Oral)? HR:?81(Peripheral)? RR:?17? BP:?145/98? SpO2:?97%? WT:?88?kg? BMI:?34.38?  Physical Exam  General:?obese male?in no acute distress  Eye: PERRLA, EOMI, clear conjunctiva, eyelids normal  Respiratory:?clear to auscultation bilaterally  Cardiovascular:?regular rate and rhythm without murmurs, gallops or rubs  Gastrointestinal:?soft,?mildly tender in middle-left lower quadrant,?non-distended with normal bowel sounds, without masses to palpation  Genitourinary: no CVA tenderness to palpation B/L  Musculoskeletal:?full range of motion of all extremities/spine without limitation or discomfort  Integumentary: no rashes or skin lesions present  Neurologic: cranial nerves intact, no signs of peripheral neurological deficit, motor/sensory function intact  Patient Discharge Condition  Patient was discharged in clinical and hemodynamic stable condition.  Discharge Disposition  Patient was told to report to the ED should his symptoms return or condition worsens. Patient voiced understanding.  F/u in Post Wards clinic in 1 weeks with labs.  f/u with PCP  in 3-4 weeks.   Discharge Medication Reconciliation  Prescribed  ondansetron (Zofran ODT 4 mg oral tablet, disintegrating)?4 mg, Oral, q12hr, PRN nausea  Continue  ferrous gluconate (ferrous gluconate 324 mg (38 mg elemental iron) oral tablet)?324 mg, Oral, Daily  hydrochlorothiazide-lisinopril (hydrochlorothiazide-lisinopril 12.5 mg-20 mg oral tablet)?1 tab(s), Oral, Daily  pantoprazole (Protonix 40 mg ORAL enteric coated tablet)?40 mg, Oral, Daily  Education and Orders Provided  Cannabis Use  Disorder  Discharge - 12/28/20 13:00:00 CST, Home?  Follow up  Report to Emergency Department if symptoms return or worsen  follow up Dr Jarrell on Jan 18, 2021 for post wards , Firm [facility]  Car Seat Challenge  No Qualifying Data      I was present with the Resident during discharge day management.  ???  [ ]x I discussed the case with the Resident and agree with the discharge plan as above.  [ ] I discussed the case with the Resident and agree with the discharge plan as above except:  ???  Time spent on discharge [ 35] minutes  Will need to assess proteinuria as outpatient and need for ACE inhibitor with frequent AKIs, consider switching to CCB if no proteinuria

## 2022-05-02 NOTE — HISTORICAL [facility] CERNER
This is a historical note converted from Enzo. Formatting and pictures may have been removed.  Please reference Enzo for original formatting and attached multimedia. Chief Complaint  Referred for Bilat knees  History of Present Illness  40?yo?male?non-smoker?  Today:??New?patient presents to ortho clinic for? ?initial visit?for???bilateral ?knee? ?pain? BMI???36?  Onset: ?Insidious over years?? ?fluctuates in intensity  Current pain level: 1/10?R?= L points to?medial knee? ?without pain medication. Quality described as??sharp?.? Patient?denies?use of pain medication today.?  Medications r/t complaint: Patient reports using?RX / OTC?medications in past including:?OTC pain relievers, meloxicam, methocarbamol, naproxen. ?Currently taking?meloxicam, OTC pain relievers??PRN?pain with?mild?relief.?  Modifying Factors: ?Worse with/after activity;?Improved with rest;??Stiffness after immobilization??Stiffness improved with less than 30 minutes of activity  Injury:?Denies  Previous treatment:?HEP???denies?; RX NSAIDs, denies PT, denies CSI,?denies VS; denies soft knee splint provided for PRN comfort;?denies ?RX off loading brace;?ongoing education on diet modification and low impact exercises as tolerated to reduce BMI  Associated Symptoms:?No Crepitus/Grinding; ?No numbness or tingling;??Swelling noted to knee with increased activity;?No skin changes;?No weakness;?Mild decrease in ROM;?difficulty sleeping at night s/t pain;?No locking with extension or flexion  Activity:?Sedentary, full ADLs;?Pain interferes with function/daily activity (mild)?Patient?denies?use of assistive devices??for assistance with ambulation.  PMH:? denies CVD; denies DM ; denies RA; denies gout; denies RX anticoagulants; denies intolerance to NSAIDs s/t GI issues; denies ?intolerance to NSAIDs s/t kidney disease  Family History:?Family history of arthritis  PCP:?Dr. Jarrell [facility]?, referral source same  Employment:? 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 BP:?127/89?  HT:?160.02?cm? WT:?94.100?kg? BMI:?36.75?  MSK: ?Bilateral??KNEE  General: No apparent distress and?no pain indicators in general appearance;?obese  Inspection:?Normal gait/ station;??full weight bearing;??no swelling;?no erythema;?No contusion;?no atrophy?/ deconditioning noted;?normal alignment?;?prominence of the tibial tubercle ?bilateral  Palpation:?non-tender;?No tenderness at lateral or medial retinaculum?Crepitus:?Negative;?Normal temperature  ROM:?  ?????Active Extension/Flexion (0-140):?0-138 bilateral  Strength:?  ?????Flexion??5/5  ?????Extension??5/5  Special Tests:  ?????a) Effusion Testing:  ??????????Ballotable Effusion: ?Negative  ??????????Fluid Wave:?Negative  ?????b) Patellar Testing:  ??????????Patellar Grind: ?Negative  ??????????Apprehension:?Negative  ??????????Patella?Facet?Tenderness:?Negative  ?????c) Meniscal Tear Assessment:?  ??????????Frank:?Negative?  ?????d) Ligamentous Testing:  ?????????ACL Anterior Drawer:?Negative  ?????????PCL Posterior Drawer:?Negative  ??? ?????LCL Varus Test:?Negative  ?????????MCL Valgus Test:?Negative?????  Neurovascular:?Intact;? sensation intact to light touch  Neuro/Psych: Awake, Alert, Oriented; Normal mood and affect  Lymphatic: No LAD  Skin and Soft Tissue: No bruising, No ecchymosis; No rash; Skin intact  Assessment/Plan  1.?Osteoarthritis of knees, bilateral?M17.0  ?1.? Discussed with patient diagnosis and treatment recommendations.? Handout  given.  2.? Imaging:?Radiological studies ordered,?reviewed,?and independently interpreted by me; discussed with patient. Noted?mild DJD  3.? Treatment plan: Conservative treatment to include oral glucosamine 1500 mg/day; Topical capsaicin as needed; Hot or cold therapy; Adequate vitamin C/D intake  4.? Procedure:?Discussed CSI vs VS injections as treatment option in future if conservative measures do not improve symptoms? declines, feels pain not significant enough for CSI  5.? Activity:?Activity as tolerated; HEP to included aerobic conditioning with non-painful activity and ROM/STG exercises;?recommend exercise bike with RPM set at 80 or higher build to 40 minutes 3xs per week as tolerated; ?proper footwear; assistive device to avoid limping;?rubber band provided for HEP?  6.? Therapy:?none  7.? Medication:?First line treatment with daily ?acetaminophen 1000 mg three times daily; Medication precautions given; continue medications as RX per PCP; RX topical diclofenac for PRN relief, medication precautions given  8.? RTC:?PRN; call if any issues? if desires CSI in future  9.? Note:??None  Ordered:  Clinic Follow-up PRN, 01/28/21 11:25:00 CST  Office/Outpatient Visit Level 4 Established 227[address] PC, Osteoarthritis of knees, bilateral, [facility], 01/28/21 11:25:00 CST  ?  2.?Hypertension?I10  1.??Educated patient to reduce salt in diet?and?limit alcohol consumption  2.? Patient instructed?to take?medications that have been prescribed for HTN?as instructed  3.? Follow up with PCP for management and care of HTN?  ?  Orders:  diclofenac topical, 2 gm =, TOP, QID, PRN PRN as needed for pain, # 100 gm, 3 Refill(s), Pharmacy: [id number], 160.02, cm, Height/Length Dosing, 01/28/21 9:48:00 CST, 94.1, kg, Weight Dosing, 01/28/21 9:48:00 CST  Referrals  Clinic Follow-up PRN, 01/28/21 11:25:00 CST   Problem List/Past Medical History  Ongoing  Cannabis hyperemesis syndrome co-occurrent and due to cannabis abuse  GERD -  Gastro-esophageal reflux disease  Hypertension  Obesity  Vomiting  Historical  Hypertension  Procedure/Surgical History  Biopsy Gastrointestinal (07/17/2019)  Esophagogastroduodenoscopy (07/17/2019)  Esophagogastroduodenoscopy, flexible, transoral; with biopsy, single or multiple (07/17/2019)  Excision of Esophagus, Via Natural or Artificial Opening Endoscopic, Diagnostic (07/17/2019)  Biopsy Gastrointestinal (07/18/2018)  Esophagogastroduodenoscopy (07/18/2018)  Esophagogastroduodenoscopy, flexible, transoral; with biopsy, single or multiple (07/18/2018)  Excision of Esophagogastric Junction, Via Natural or Artificial Opening Endoscopic (07/18/2018)  Esophagogastroduodenoscopy (06/15/2016)  Inspection of Upper Intestinal Tract, Via Natural or Artificial Opening Endoscopic (06/15/2016)  inguinal hernia repair (03.1982)   Medications  Compazine, 10 mg= 2 mL, IV Push, Once  diclofenac 1% topical gel, 2 gm, TOP, QID, PRN, 3 refills  ferrous sulfate 325 mg (65 mg elemental iron) oral delayed release tablet, 325 mg= 1 tab(s), Oral, Every other day, 3 refills  hydrochlorothiazide-lisinopril 12.5 mg-20 mg oral tablet, 1 tab(s), Oral, Daily, 3 refills  Protonix 40 mg ORAL enteric coated tablet, 40 mg= 1 tab(s), Oral, Daily, 3 refills  Allergies  No Known Allergies  Social History  Abuse/Neglect  No, No, Yes, 01/28/2021  Alcohol  Current, Beer, Liquor, 1-2 times per week, 11/05/2020  Employment/School  Employed, 02/20/2020  Exercise  Exercise duration: 0., 02/20/2020  Home/Environment  Lives with Father, Mother. Living situation: Home/Independent., 02/20/2020  Nutrition/Health  Regular, Good, 12/21/2020  Sexual  Gender Identity Identifies as male., 12/21/2020  Substance Use  Past, Marijuana, 01/18/2021  Tobacco  5-9 cigarettes (between 1/4 to 1/2 pack)/day in last 30 days, Cigarettes, Yes, 01/28/2021  Family History  Hyperlipidemia.: Mother (Dx at 45).  Hypertension.: Mother and Father.  Immunizations  Vaccine Date Status    influenza virus vaccine, inactivated 09/29/2020 Given   influenza virus vaccine, inactivated 02/20/2020 Given   influenza virus vaccine, inactivated 10/20/2018 Given   tetanus/diphtheria/pertussis, acel(Tdap) 05/17/2018 Given   influenza virus vaccine, inactivated 11/30/2016 Given   poliovirus vaccine, live, trivalent 1981 Recorded   Health Maintenance  Health Maintenance  ???Pending?(in the next year)  ??? ??OverDue  ??? ? ? ?Influenza Vaccine due??10/01/20??and every 1??day(s)  ??? ??Due In Future?  ??? ? ? ?Hypertension Maintenance-Medication Prescribed not due until??08/20/21??and every 1??year(s)  ??? ? ? ?Hypertension Management-Education not due until??09/29/21??and every 1??year(s)  ??? ? ? ?Hypertension Management-BMP not due until??12/28/21??and every 1??year(s)  ??? ? ? ?Obesity Screening not due until??01/01/22??and every 1??year(s)  ??? ? ? ?Smoking Cessation not due until??01/01/22??and every 1??year(s)  ??? ? ? ?Alcohol Misuse Screening not due until??01/02/22??and every 1??year(s)  ??? ? ? ?ADL Screening not due until??01/18/22??and every 1??year(s)  ???Satisfied?(in the past 1 year)  ??? ??Satisfied?  ??? ? ? ?ADL Screening on??01/18/21.??Satisfied by Marie Salcido LPN  ??? ? ? ?Alcohol Misuse Screening on??01/18/21.??Satisfied by Papa Jarrell DO  ??? ? ? ?Blood Pressure Screening on??01/28/21.??Satisfied by Mi Steward LPN  ??? ? ? ?Body Mass Index Check on??01/28/21.??Satisfied by Mi Steward LPN  ??? ? ? ?Depression Screening on??01/28/21.??Satisfied by Mi Steward LPN  ??? ? ? ?Diabetes Screening on??12/28/20.??Satisfied by Deny Mckeon  ??? ? ? ?Hypertension Management-Blood Pressure on??01/28/21.??Satisfied by Mi Steward LPN  ??? ? ? ?Hypertension Management-BMP on??12/28/20.??Satisfied by Deny cMkeon  ??? ? ? ?Hypertension Management-Education on??09/29/20.??Satisfied by Papa Jarrell DO  ??? ? ?  ?Hypertension Maintenance-Medication Prescribed on??08/20/20.??Satisfied by Minh Sanchez  ??? ? ? ?Influenza Vaccine on??01/18/21.??Satisfied by Papa Jarrell DO  ??? ? ? ?Lipid Screening on??02/20/20.??Satisfied by Aundrea Vizcaino  ??? ? ? ?Obesity Screening on??01/28/21.??Satisfied by Mi Steward LPN  ??? ? ? ?Smoking Cessation on??01/18/21.??Satisfied by Papa Jarrell DO  ?  Diagnostic Results  01/28/2021 10:05 CST XR Knee Right 4 or More Views  Radiology Report  XR Knee Right 4 or More Views  HISTORY: Pain  COMPARISON: None.  FINDINGS:  No acute displaced fractures or dislocations.  There is minimal narrowing of the medial compartment of the knee joint  articular spaces are otherwise preserved with smooth articular  surfaces  No blastic or lytic lesions.  Soft tissues within normal limits.  IMPRESSION:  Degenerative changes  01/28/2021 10:05 CST XR Knee Left 4 or More Views  Radiology Report  XR Knee Left 4 or More Views  HISTORY: Pain  COMPARISON: None.  FINDINGS:  No acute displaced fractures or dislocations.  There is minimal narrowing of the medial compartment of the knee joint  articular spaces are otherwise preserved with smooth articular  surfaces  No blastic or lytic lesions.  Soft tissues within normal limits.  IMPRESSION:  Degenerative changes

## 2022-05-02 NOTE — HISTORICAL [facility] CERNER
This is a historical note converted from Enzo. Formatting and pictures may have been removed.  Please reference [place] for original formatting and attached multimedia. Chief Complaint  vomiting 2 days ago states went back to work , weakness/ dizziness , chest tightness and tunnel vision today . Hypotensive in triage tachycardic  History of Present Illness  Patient is a 38-year-old male with past medical history of hypertension comes into the ED with generalized weakness that started a few hours into his work shift. ?Patient states that he began to feel a little dizzy, started out of vision, and mild chest tightness. ?Symptoms have resolved when he arrived to the ED, but continues to feel weak and that he can pass out at any time. ?He has no episodes of losing consciousness or falls. ?Additionally patient endorses having diarrhea and nausea/ vomiting that started Sunday. ?He described vomit as nonbilious nonbloody and has since resolved. ?He described diarrhea is initially artery, but is improving and is more semisolid today. ?He has not noticed any mucus or blood in stool. ?He states hes been trying to drink a lot of fluid since his diarrhea started, but states sometimes he is too nauseous to drink. ?Patient denies any current headache, vision changes, chest pain, fever, chills, abdominal pain, and urinary symptoms.  ?  Past medical history: Hypertension  ?  Family history: Mother: Hypertension, diabetes  Grandfather on mothers side: brain cancer  ?  Surgical history: None  ?  Social history: Patient smoked for 10 years starting around age 18 but quit 9 years ago, he only drinks socially 2 beers per week, patient endorsed smoking marijuana 2 weeks ago, but no other drugs.  Review of Systems  Constitutional: No fever, chills, wt loss, or fatigue.?+Weakness  HEENT: Narrowing of vision. No?ear pain, nasal congestion, sore throat.?  Respiratory: No shortness of breath, cough  Cardiovascular: No chest pain,  palpitations, peripheral edema.?  Gastrointestinal: +Nausea and diarrhea; No abdominal pain. Endorsed vomiting 4 days ago  Genitourinary: No dysuria, gross hematuria?  Endocrine: No polyuria, polydipsia  Musculoskeletal: No muscle pain, joint pain or deformity?  Integumentary: No rash, breakdown, wounds, No foot ulcers  Neuro: No focal neuro deficit, No change in sensation, No burning sensation in hands or feet.?  Physical Exam  Vitals & Measurements  T:?36.8? ?C (Oral)? TMIN:?36.5? ?C (Oral)? TMAX:?36.8? ?C (Oral)? HR:?88(Peripheral)? RR:?18? BP:?124/84? BP:?87/65(Sitting)? BP:?81/63(Standing)? BP:?101/63(Supine)? SpO2:?100%? WT:?86.1?kg? WT:?86.1?kg?  General: Alert and oriented, No acute distress.  HEENT: Normocephalic, Normal conjunctiva, Oral mucosa isdry, No pharyngeal erythema, No sinus tenderness.  Neck: Supple, No lymphadenopathy.  Respiratory: Lungs are clear to auscultation, Respirations are non-labored, Breath sounds are equal.  Cardiovascular: Normal rate, Regular rhythm, No murmur, No gallop.  Gastrointestinal: Soft, Non-tender, Non-distended, Normal bowel sounds, No organomegaly.  Lymphatics: No lymphadenopathy neck, axilla, groin.  Musculoskeletal: Normal range of motion, Normal strength, No tenderness, No edema.  Integumentary: Warm, Pink, No pallor.  Neurologic: Alert, Oriented.  Psychiatric: Appropriate mood and affect, Normal judgment.  ?  Assessment/Plan  JORJE  -Creatinine 3.3, BUN 49, GFR 23  -Baseline creatinine around 1.3  -Recent vomiting and diarrhea with poor fluid intake  -IV fluid at 250 mL per hour  -Replete potassium with by mouth potassium chloride  -Follow-up with a.m. labs  ?  Hyponatremia  Hypochloremia  Hypermagnesemia  -IV fluids at 250 mL per hour  -Follow in a.m.  ?  Hypokalemia  -Potassium 3.0  -Replete with 40 mEq by mouth  ?  Infectious diarrhea?  -Diarrhea is improving  -Semisolid stools  -On Rocephin and Flagyl IV  -Stool cultures, C. diff, fecal leukocytes pending  ?  Hx  of Hypertension  -98/68  -Holding hypertensive meds for now  -Reassess in a.m.  ?  GERD  -Protonix 40 mg by mouth  ?  Prophylaxis DVT: Lovenox 40 mg  ?  Disposition: 38-year-old male with JORJE receiving IV hydration. ?Possible infectious diarrhea receiving Rocephin and Flagyl. ?Stool cultures, C. diff, fecal leukocyte pending   Problem List/Past Medical History  Ongoing  Cannabis hyperemesis syndrome co-occurrent and due to cannabis abuse  Hypertension  Obesity  Historical  Hypertension  Procedure/Surgical History  Biopsy Gastrointestinal (07/18/2018)  Esophagogastroduodenoscopy (07/18/2018)  Esophagogastroduodenoscopy, flexible, transoral; with biopsy, single or multiple (07/18/2018)  Excision of Esophagogastric Junction, Via Natural or Artificial Opening Endoscopic (07/18/2018)  Esophagogastroduodenoscopy (06/15/2016)  Inspection of Upper Intestinal Tract, Via Natural or Artificial Opening Endoscopic (06/15/2016)  inguinal hernia repair (03/1982)   Medications  Inpatient  acetaminophen, 1000 mg= 2 tab(s), Oral, q6hr, PRN  acetaminophen, 650 mg= 2 tab(s), Oral, q6hr, PRN  Flagyl 500 mg / 100 ml premix, 500 mg= 100 mL, IV Piggyback, x6xg-Plbal  Influenza Virus Vaccine, Inactivated, 0.5 mL, IM, Daily  IVF Lactated Ringers LR Infusion 1,000 mL, 1000 mL, IV  K-Dur 10 oral tablet, extended release, 40 mEq= 4 tab(s), Oral, Once  Lactated Ringers 1000ml 1,000 mL, 1000 mL, IV  Lovenox, 40 mg= 0.4 mL, Subcutaneous, Daily  Protonix 40 mg ORAL enteric coated tablet, 40 mg= 1 tab(s), Oral, Daily  Rocephin (for IVPB)  Zofran, 8 mg= 4 mL, IV Piggyback, q4hr, PRN  Zofran, 4 mg= 2 mL, IV Push, q4hr, PRN  Home  Bentyl 10 mg oral capsule, 20 mg= 2 cap(s), Oral, QID,? ?Not taking  lisinopril 20 mg oral tablet, 20 mg= 1 tab(s), Oral, Daily, 3 refills  Protonix 40 mg ORAL enteric coated tablet, 40 mg= 1 tab(s), Oral, Daily, 2 refills  Zofran ODT 8 mg oral tablet, disintegrating, 8 mg= 1 tab(s), Oral, q8hr, PRN,? ?Not  taking  Allergies  No Known Allergies  Social History  Alcohol  Current, Beer, 1-2 times per week, Alcohol use interferes with work or home: No. Drinks more than intended: No. Others hurt by drinking: No. Ready to change: No. Household alcohol concerns: No., 10/18/2018  Current, 06/20/2018  Employment/School  Employed, Work/School description: [employer]. Workplace hazards: Heavy lifting/twisting., 05/17/2018  Exercise  Self assessment: Good condition., 07/27/2016  Home/Environment  Lives with Father, Mother. Living situation: Home/Independent. Alcohol abuse in household: No. Substance abuse in household: No. Smoker in household: No., 07/27/2016  Nutrition/Health  Regular, Caffeine intake amount: 1-2 CUPS TEA IN AM. Sleeping concerns: No. Feels highly stressed: No., 07/27/2016  Substance Abuse  Never, Marijuana, 06/20/2018  Current, Marijuana, 3-5 times per week, 03/24/2018  Tobacco  Former smoker Use:. 10 year(s). Started age 18 Years. Stopped age 28 Years., 07/18/2018  Family History  Hyperlipidemia.: Mother (Dx at 45).  Hypertension.: Mother and Father.  Immunizations  Vaccine Date Status   tetanus/diphtheria/pertussis, acel(Tdap) 05/17/2018 Given   influenza virus vaccine, inactivated 11/30/2016 Given   Lab Results  Labs Last 24 Hours?  ?Chemistry? Hematology/Coagulation?   Sodium Lvl:?130 mmol/L?Low (10/18/18 17:50:00 CDT) WBC:?15.5 x10(3)/mcL?High (10/18/18 17:50:00 CDT)   Potassium Lvl:?3 mmol/L?Low (10/18/18 17:50:00 CDT) RBC: 5.26 x10(6)/mcL (10/18/18 17:50:00 CDT)   Chloride:?86 mmol/L?Low (10/18/18 17:50:00 CDT) Hgb: 14.4 gm/dL (10/18/18 17:50:00 CDT)   CO2:?36 mmol/L?High (10/18/18 17:50:00 CDT) Hct: 43.3 % (10/18/18 17:50:00 CDT)   Calcium Lvl: 9.1 mg/dL (10/18/18 17:50:00 CDT) Platelet:?453 x10(3)/mcL?High (10/18/18 17:50:00 CDT)   Magnesium Lvl: 2.4 mg/dL (10/18/18 20:00:00 CDT) MCV: 82.3 fL (10/18/18 17:50:00 CDT)   Glucose Lvl:?113 mg/dL?High (10/18/18 17:50:00 CDT) MCH: 27.4 pg (10/18/18 17:50:00 CDT)    BUN:?49 mg/dL?High (10/18/18 17:50:00 CDT) MCHC: 33.3 gm/dL (10/18/18 17:50:00 CDT)   Creatinine:?3.3 mg/dL?High (10/18/18 17:50:00 CDT) RDW: 13.6 % (10/18/18 17:50:00 CDT)   eGFR-AA:?27 mL/min?Low (10/18/18 17:50:00 CDT) MPV:?10.8 fL?High (10/18/18 17:50:00 CDT)   eGFR-VEL:?23 mL/min?Low (10/18/18 17:50:00 CDT) Abs Neut:?10.16 x10(3)/mcL?High (10/18/18 17:50:00 CDT)   Bili Total: 0.4 mg/dL (10/18/18 17:50:00 CDT) Neutro Auto: 66 x10(3)/mcL (10/18/18 17:50:00 CDT)   Bili Direct: 0.1 mg/dL (10/18/18 17:50:00 CDT) Lymph Auto: 24 % (10/18/18 17:50:00 CDT)   Bili Indirect: 0.3 mg/dL (10/18/18 17:50:00 CDT) Mono Auto: 10 % (10/18/18 17:50:00 CDT)   AST: 17 unit/L (10/18/18 17:50:00 CDT) Eos Auto: 0 (10/18/18 17:50:00 CDT)   ALT: 30 unit/L (10/18/18 17:50:00 CDT) Abs Eos: 0.06 x10(3)/mcL (10/18/18 17:50:00 CDT)   Alk Phos: 82 unit/L (10/18/18 17:50:00 CDT) Basophil Auto: 0 (10/18/18 17:50:00 CDT)   Total Protein:?8.7 gm/dL?High (10/18/18 17:50:00 CDT) Abs Neutro: 10.16 x10(3)/mcL (10/18/18 17:50:00 CDT)   Albumin Lvl: 4.1 gm/dL (10/18/18 17:50:00 CDT) Abs Lymph: 3.66 x10(3)/mcL (10/18/18 17:50:00 CDT)   Globulin:?4.6 gm/mL?High (10/18/18 17:50:00 CDT) Abs Mono: 1.5 x10(3)/mcL (10/18/18 17:50:00 CDT)   A/G Ratio: 1 ratio (10/18/18 17:50:00 CDT) Abs Baso: 0.04 x10(3)/mcL (10/18/18 17:50:00 CDT)    IG%: 0 % (10/18/18 17:50:00 CDT)    IG#: 0.05 (10/18/18 17:50:00 CDT)   ?  ?      HO-II addendum.?  Patient seen and examined with intern. Chart reviewed.?  ?   This is a 38-year-old  male with past medical history of hypertension who presents to the ED with weakness, malaise, chest tightness of 1 days duration. ?Of note, patient previously presented to the ED 5 days ago with symptoms of nausea, vomiting, diarrhea of 2 days duration-she was given a fluid bolus and sent home with antiemetics and antispasmatics. 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 Reports that he had 3-4  episodes of nonbloody diarrhea 6 days ago along with numerous episodes of vomiting and persistent nausea. ?He states that he continued to have several episodes of diarrhea over the last 3-4 days; and has had poor oral intake secondary to his nausea. ?Denies any fevers, chills; however, does report that he has felt dizzy and weak over the last 2 days. ?Also endorses diffuse abdominal cramping that accompanied the diarrhea. ?ROS is negative for all other symptoms. ?Has not been on any antibiotics recently. ?Formerly used to smoke marijuana consistently-states that he has?not had any marijuana use in the last 2 weeks.?  In the ED, vitals significant for tachycardia 109, elevated blood pressure 160/109 with subsequent hypotension of SBP 80s after a few hours. ?Labs significant for hyponatremia, hypochloremia (no anion gap) hypokalemia 3.0; hypermagnesemia 3.0; JORJE, BUN 49 creatinine 3.3; WBC 15.5 with 10% bands. ?Physical exam unremarkable. EKG with sinus tachycardia. Admitting patient for JORJE in the setting of acute volume depletion secondary to acute gastroenteritis; replacing electrolytes; total of 80 mEq potassium given. ?He received 3 L fluid boluses in the ED-maintenance fluids LR to 50 mL an hour. ?Stool studies, C. diff PCR, cultures ordered-starting IV Rocephin and Flagyl for empiric coverage. ?Diet to be advance as tolerated-antiemetics and GI prophylaxis ordered. ?Holding all home oral anti-HTN meds for now. Patient also on DVT prophylaxis with Lovenox 30mg. F/u AM labs.?  ?  ?  ?   I have seen the patient with the residents, re assessed the historical, physical, laboratory, and radiologic findings.  I have discussed the case with the residents and made recommendations.   Patient seen and evaluated on rounds  Cr improved this am 1.9<3.3, WBC trending down 13.3<15.5, K, Na wnl, Cl improved.  Will decrease fluids to 150cc/hr NS, Cont to monitor renal function, contact precautions for C. Diff , cont to FU  cultures, advance diet as emmie  Likely DC in am  ?

## 2022-05-14 NOTE — DISCHARGE SUMMARY
Internal Medicine Discharge Summary  ?   Admit and Discharge Dates  Admit Date: 01/28/2022  Discharge Date: 01/30/2022  ?  Physicians  ?Attending Physician - Camille MIRZA, Adriana  Primary Care Physician - No PCP, No  ?  Discharge diagnosis  Hypotension, unspecified (I95.9)?  Unspecified kidney failure (N19)?  Alkalosis (E87.3)?  Hypotension, unspecified (I95.9)?  Acute kidney failure, unspecified (N17.9)?  Alkalosis (E87.3)?  Unspecified kidney failure (N19)?  Syncope and collapse (R55)?  Syncope and collapse (R55)?  ?  Surgical procedures  No procedures recorded for this visit.  ?  Immunizations  01/29/2022 - influenza virus vaccine, inactivated?  ?  Admission information  This is a 41-year-old male with history of?hypertension and GERD?who presents to the ED?with chief complaint of dizziness and?syncopal episode x1.?Patient states that?his dizziness?began yesterday night?and got progressively worse?this morning.?States that he had?one episode in which she try to stand up but immediately passed out.?States his fall was witnessed by several people?denies any seizure-like activity?or bowel or bladder incontinence.?States prior to syncope, denies associated chest pain, diaphoresis, shortness of breath, nausea and vomiting.?Does note that he has had?occasional episodes of vomiting?with the past week?has had?a couple episodes of diarrhea?within last 2 days.?Denies associated?blood in stool,?melena, hematemesis, hemoptysis?and?epistaxis.?Patient also notes that he?has had a loss of appetite?within the last week. States that?he has felt overwhelmed with life?and feels that his anxiety is making him less hungry.?States that he is able to tolerate food?and water?on a daily basis without any?nausea or vomiting,?just does not feel hungry anymore. Does confirm denial of?associated homicidal or suicidal ideation.?  ?   In the ED, patient was noted to be tachycardic and hypotensive. Patient was given 3 L of IV fluid bolus and  blood pressure was noted to be improved to 107/56. Heart rate also noted?normalized. Patient was evaluated with a CMP, lactic acid, troponin, CBC, urinalysis, CT head without contrast, shoulder, chest x-ray and EKG. Results are pertinent for a sodium 128, potassium 3.3, chloride 80, CO2 30, calcium 10.8, BUN 68.4 creatinine 6.92, H&H 11.9/36.9 and a UA with trace blood?and?1+ glucose. Patient was started on?broad-spectrum antibiotics and?then consulted to internal medicine. He was admitted with a diagnosis of syncope, JORJE and hypotension.  ?  ?  Hospital course  Patient received 3 L bolus in the ED. ?On day 1 of admission patient received 2 L bolus as well as syncope work-up was initiated. ?Retroperitoneal ultrasound showed no evidence of acute or new focal renal abnormality, no findings is obstructive uropathy. ?Carotid ultrasounds was performed showing 50 to 69% stenosis however ratios were within normal limits. ?Patients electrolytes were repleted. ?Patient will need outpatient echocardiogram with bubble study to be ordered at post wards follow-up. ?On the morning of day 2 patient received 1 more liter bolus and repeat labs were drawn showing CR of 1.89 resulting in a total decrease in CR of 5 and approximately 30 hours. ?Patient remained stable throughout inpatient stay and he was deemed stable for discharge.  ?  Significant findings  As above  ?   Time spent on discharge  Greater than 35 minutes  ?   OBJECTIVE  ?Vital Signs (last 24 hrs)_____??????????Last Charted___________  Temp Oral??????????????????????36.9 DegC ?(JAN 30 15:00)  Heart Rate Peripheral?????????????????96 bpm ?(TITO 30 15:00)  Resp Rate???????????????????????18 br/min ?(JAN 30 15:00)  SBP??????????????????????120 mmHg ?(JAN 30 15:00)  DBP??????????????????????86 mmHg ?(JAN 30 15:00)  SpO2??????????????????????100 % ?(JAN 30 15:00)??  ?  Physical Exam  General:?Awake, alert, & oriented to person, place & time. No acute  distress  Psychiatric:?Mood and affect normal  HEENT:?Normocephalic, atraumatic. Face symmetric.  Cardiovascular:?Regular rate & rhythm. Normal S1 &?S2 w/out murmurs, rubs or gallops.  Pulmonary:?Bilateral symmetric chest rise. Non-labored, CTAB  Abdominal:??Soft, nontender, nondistended. Bowel sounds present  Extremities:?No clubbing, cyanosis or edema  Skin:??Exposed skin is warm & dry.  Neuro:???Patient moves all extremities equally  ?  Patient discharge condition  Stable  ?  Discharge disposition  Mr.Jeffrey Pinzon?is being discharged?home.  ?   Discharge Medications  Continue  hydrOXYzine (hydrOXYzine hydrochloride 25 mg oral tablet)?25 mg, Oral, QID, PRN as needed for anxiety  hydrochlorothiazide-lisinopril (hydrochlorothiazide-lisinopril 12.5 mg-20 mg oral tablet)?1 tab(s), Oral, Daily  pantoprazole (Protonix 40 mg ORAL enteric coated tablet)?40 mg, Oral, Daily  Discontinue  diclofenac (diclofenac sodium 75 mg oral delayed release tablet)?75 mg, Oral, BID, PRN as needed for pain  dicyclomine (Bentyl 10 mg oral capsule)?20 mg, Oral, QID, PRN abdominal spasms  tiZANidine (tiZANidine 4 mg oral tablet)?8 mg, Oral, At Bedtime, PRN pain  ?   Education and orders provided  Dehydration, Adult, Easy-to-Read  Syncope, Easy-to-Read  Discharge - 01/30/22 13:18:00 CST, Home, Give all scheduled vaccinations prior to discharge.?  Discharge Activity - Activity as Tolerated?  Discharge Diet - Regular?  ?  Follow-up  [facility], within 2 to 4 weeks  ????Post wards follow-up  Report to Emergency Department if symptoms return or worsen  ?  To address at follow-up  -The following labs are to be drawn at the [facility] visit: None  -The following imaging studies are to be ordered at the [facility] visit: Referral for outpatient echo with bubble.  ?  ?  The above information was discussed with?the patient?in clear terms.?Hewasable to repeat the instructions to me in?hisown words. All questions answered. ED precautions  provided.?  ?  ?  Yogi Connelly MD  [facility] Internal Medicine - PGY1  ?  ?  ?  ?  ?  ?  ?  Staff Addendum:  I was present with the resident during the discharge planning process. Care provided is reasonable and necessary.  ???  [] I discussed the case with the resident and agree with the findings and plan as documented in the residents note.  [x] I discussed the case with the resident and agree with the findings and plan as documented in the residents note except:  ?  Admitted following syncopal episode which was secondary to orthostatic hypotension from decreased PO intake and diuretic use. Resolved completely with fluids, ambulating without issue. Doing well today, renal function significantly improved. Given use of lisinopril-HCTZ with normal BP during admission, will plan to dc HCTZ until seen in post han visit or PCP. Ok to continue lisinopril 20mg daily.?ECHO to be done as outpatient in one week, orders placed. Post han visit in 1-2 weeks.

## 2022-05-21 NOTE — HISTORICAL [facility] CERNER
This is a historical note converted from En[employer]. Formatting and pictures may have been removed.  Please reference Enzo for original formatting and attached multimedia. Chief Complaint  Pt to ed c/o dizziness and syncopal episode tonight. Reports fell on left shoulder and hit his head. Hypotensive.  History of Present Illness  This is a 41-year-old male with history of?hypertension and GERD?who presents to the ED?with chief complaint of dizziness and?syncopal episode x1.?Patient states that?his dizziness?began yesterday night?and got progressively worse?this morning.?States that he had?one episode in which she try to stand up but immediately passed out.?States his fall was witnessed by several people?denies any seizure-like activity?or bowel or bladder incontinence.?States prior to syncope, denies associated chest pain, diaphoresis, shortness of breath, nausea and vomiting.?Does note that he has had?occasional episodes of vomiting?with the past week?has had?a couple episodes of diarrhea?within last 2 days.?Denies associated?blood in stool,?melena, hematemesis, hemoptysis?and?epistaxis.?Patient also notes that he?has had a loss of appetite?within the last week. States that?he has felt overwhelmed with life?and feels that his anxiety is making him less hungry.?States that he is able to tolerate food?and water?on a daily basis without any?nausea or vomiting,?just does not feel hungry anymore. Does confirm denial of?associated homicidal or suicidal ideation.?  ?  Past medical history:?Hypertension,?GERD,?cannabis hyperemesis syndrome  Past surgical history:?Inguinal hernia repair  Allergies: None  Family history:?Hypertension  Social history:?Drinks about?2-3 shots?of whiskey a week,?denies the use of marijuana but denies?any other illicit or IV drug use?and?tobacco  ?  In the ED, patient was noted to be tachycardic and hypotensive. Patient was given 3 L of IV fluid bolus and blood pressure was noted to be improved to  107/56. Heart rate also noted?normalized. Patient was evaluated with a CMP, lactic acid, troponin, CBC, urinalysis, CT head without contrast, shoulder, chest x-ray and EKG. Results are pertinent for a sodium 128, potassium 3.3, chloride 80, CO2 30, calcium 10.8, BUN 68.4 creatinine 6.92, H&H 11.9/36.9 and a UA with trace blood?and?1+ glucose. Patient was started on?broad-spectrum antibiotics and?then consulted to internal medicine. He was admitted with a diagnosis of syncope, JORJE and hypotension.  Review of Systems  CONSTITUTIONAL: No weight loss, subjective fever, chills, weakness or fatigue  SKIN: No rash or itching  CARDIOVASCULAR: No chest pain, palpitations or edema  RESPIRATORY: No shortness of breath, cough or sputum  GASTROINTESTINAL: No anorexia, nausea, vomiting or diarrhea. No abdominal pain or blood.  GENITOURINARY: No dysuria, hematuria or incontinence  NEUROLOGICAL: +dizziness/ +syncope  HEMATOLOGICAL: No anemia, bleeding or bruising.  LYMPHATICS: No enlarged nodes  ENDOCRINOLOGIC: No polyuria or polydipsia.  Physical Exam  Vitals & Measurements  T:?37.1? ?C (Oral)? TMIN:?36.8? ?C (Temporal Artery)? TMAX:?37.1? ?C (Oral)? HR:?79(Peripheral)? HR:?88(Monitored)? RR:?23? BP:?107/56? SpO2:?93%? WT:?88.65?kg? BMI:?34.63?  Gen:?Alert oriented x3. No acute distress. Sitting comfortably in bed  Head:?Normocephalic and atraumatic  Eyes:?PERRL and EOMI  Nose:?No nasal drainage noted  Throat:?Moist mucous membranes  Neck:?No cervical lymphadenopathy  Lungs:?Clear equal breath sounds bilaterally no evidence of wheezing or rhonchi  CV:?Regular rate and rhythm with no rubs or murmurs  Abdomen:?Nontender and nondistended abdomen. Bowel sounds present  Ext:?2+ radial and pedal pulses. No lower extremity edema  Neuro:?The patient is moving all 4 extremities comfortably no slurred speech  Psych:?Calm and cooperative  Skin:?No rashes noted  Assessment/Plan  Syncope  -At this time, the etiology of the presyncope does not  appear?clear.?Based on the patients history,?seems likely be?orthostatic hypotension.  -?Will get?orthostatic?vital signs?we will also do a complete syncopal work-up with?EKG, echocardiogram?with bubble study?and carotid ultrasound.  ?  Acute kidney injury, likely prerenal azotemia  -Patient has?a significant increase in BUN and creatinine,?at?68.4 and?6.92?respectively.?BUN/creatinine ratio?is 10.  -All patients?history sounds more consistent with?dehydration,?the patients?labs are more consistent with?a possible?intrarenal?azotemia  -Patient was given?3 L of fluid bolus in the ED?and will start maintenance fluid.  -We will also get a retroperitoneal ultrasound?and?consult?nephrology in the morning?if?his renal indices?are unimproved  ?  Hypochloremic?hyponatremia  -Patients?sodium?is?128 and chloride is 80.?This is possibly simply?dehydration,?cannot rule out other etiologies of?his hyponatremia.  -We will evaluate new onset hyponatremia?with serum osmolality,?urine osmolality and urine sodium  ?  Hypokalemia  -Patients potassium noted to be 3.3?with a normal magnesium and phosphorus.  -We will replete with?40 mEq of oral KCl along with?20 mEq of K rider.  ?  Metabolic alkalosis  -At this time, unsure of the cause of the patients metabolic alkalosis  -Patient does note some nausea vomiting?recently?but no other obvious causes?of?metabolic alkalosis.?Patient has no?COPD or hypoxic?history.  -We will continue to monitor  ?  Hypercalcemia  -Patients calcium noted to be 10.8,?likely consistent with?dehydration.  -We will recheck calcium after?patients?fluid boluses?and defer to primary team for treatment.  ?  Microcytic anemia  -Patient noted to have?H&H of 11.9/36.9.?This is a?two-point drop from her last CBC?on 1/3/2022.  -Iron studies?appear to be consistent with?iron deficiency anemia  -We will continue to monitor  ?  Leukocytosis  -Patient noted to have a leukocytosis of 14.5 along with tachycardia, hypotension  and tachypnea on arrival to ED. Vital signs noted to improve with fluid resuscitation.  -Was started on Vancomycin and Zosyn. However, urine and chest has no evidence of infectious etiology, nor is there any symptoms.  -Will discontinue antibiotics at this time given their nephrotoxic side-effects and defer to primary team if to re-start.  ?  DVT prophylaxis: Heparin  Antibiotics: None  Lines/drains: PIV  ?  Disposition:?Patient will be fluid resuscitated?and will undergo a syncopal work-up.?If patients renal indices do not improve, will need to consult renal in the morning.  ?  ?  ?  ?  ?  ?  ?  ?  ?  ?  ?  ?  Staff Addendum:  I was present with the resident during the history and exam.  ???  [x] I discussed the case with the resident and agree with the findings and plan as documented in the residents note.  [ ] I discussed the case with the resident and agree with the findings and plan as documented in the residents note except:  ?  ?  ?  This is a 41-year-old male who was admitted following a syncopal episode at work.? He does admit to losing consciousness and feeling weak at the knees, he fell and hit his shoulder and his head.? CT head in ED was unremarkable.? He tells me that he had been feeling poorly over the last month or so, attributes this to having a lot of stressors going on at home and feeling depressed.? He admits that he has not had any adequate p.o. intake to the point where he is losing weight.? Works as a  at [employer].? Reports being compliant with his home antihypertensives, mainly lisinopril-HCTZ.? On arrival he was found to be hyponatremic, hypokalemic, hyperchloremic.? Calcium is elevated, renal indices are significantly worse from baseline with creatinine of 7.? Feel that his renal failure is multifactorial given his decreased p.o. intake, occasional episodes of emesis/diarrhea and prescribed diuretics.? Orthostatics were taken on admission which were positive, feel that this is the source  of his syncope, however pending echo and completion of syncope work-up.? Giving IV fluid resuscitation which he is responding well to, creatinine already improved to 4.8 this morning.? If he does not continue this trend, will need to be evaluated by nephrology.? Electrolytes being replaced.   Problem List/Past Medical History  Ongoing  Cannabis hyperemesis syndrome co-occurrent and due to cannabis abuse  GERD - Gastro-esophageal reflux disease  Hypertension  Obesity  Vomiting  Historical  Hypertension  Procedure/Surgical History  Biopsy Gastrointestinal (07/17/2019)  Esophagogastroduodenoscopy (07/17/2019)  Esophagogastroduodenoscopy, flexible, transoral; with biopsy, single or multiple (07/17/2019)  Excision of Esophagus, Via Natural or Artificial Opening Endoscopic, Diagnostic (07/17/2019)  Biopsy Gastrointestinal (07/18/2018)  Esophagogastroduodenoscopy (07/18/2018)  Esophagogastroduodenoscopy, flexible, transoral; with biopsy, single or multiple (07/18/2018)  Excision of Esophagogastric Junction, Via Natural or Artificial Opening Endoscopic (07/18/2018)  Esophagogastroduodenoscopy (06/15/2016)  Inspection of Upper Intestinal Tract, Via Natural or Artificial Opening Endoscopic (06/15/2016)  inguinal hernia repair (03.1982)   Medications  Inpatient  Compazine, 10 mg= 2 mL, IV Push, Once  heparin, 5000 units= 1 mL, Subcutaneous, Daily  Influenza Virus Vaccine, Inactivated, 0.5 mL, IM, Daily  IVF Lactated Ringers LR Infusion 1,000 mL, 1000 mL, IV  Lactated Ringers 1000ml 1,000 mL, 1000 mL, IV  Protonix 40 mg ORAL enteric coated tablet, 40 mg= 1 tab(s), Oral, Daily  Home  Bentyl 10 mg oral capsule, 20 mg= 2 cap(s), Oral, QID, PRN,? ?Not taking: Last Dose Date/Time Unknown  diclofenac sodium 75 mg oral delayed release tablet, 75 mg= 1 tab(s), Oral, BID, PRN,? ?Not Taking, Completed Rx  hydrochlorothiazide-lisinopril 12.5 mg-20 mg oral tablet, 1 tab(s), Oral, Daily, 3 refills  hydrOXYzine hydrochloride 25 mg oral  tablet, 25 mg= 1 tab(s), Oral, QID, PRN  Protonix 40 mg ORAL enteric coated tablet, 40 mg= 1 tab(s), Oral, Daily, 3 refills  tiZANidine 4 mg oral tablet, 8 mg= 2 tab(s), Oral, At Bedtime, PRN,? ?Not Taking, Completed Rx  Allergies  No Known Allergies  Social History  Abuse/Neglect  No, No, Yes, 01/29/2022  No, 01/28/2022  No, No, Yes, 01/03/2022  No, 12/28/2021  No, 11/01/2021  Alcohol  Current, Beer, Liquor, Daily, Alcohol use interferes with work or home: No. Others hurt by drinking: No. Household alcohol concerns: No., 01/29/2022  Current, Liquor, Daily, 11/01/2021  Employment/School  Employed, 02/20/2020  Home/Environment  Lives with Father, Mother. Living situation: Home/Independent., 02/20/2020    Never in , 06/09/2021  Nutrition/Health  Regular, Fair, 06/09/2021  Sexual  Gender Identity Identifies as male., 12/21/2020  Spiritual/Cultural  Restorationism, 06/09/2021  Substance Use  Past, Marijuana, 01/18/2021  Tobacco  Former smoker, quit more than 30 days ago, N/A, 01/29/2022  Former smoker, quit more than 30 days ago, N/A, 01/28/2022  Former smoker, quit more than 30 days ago, N/A, 01/03/2022  Former smoker, quit more than 30 days ago, No, 12/28/2021  Former smoker, quit more than 30 days ago, No, 11/01/2021  Family History  Hyperlipidemia.: Mother (Dx at 45).  Hypertension.: Mother and Father.  Immunizations  Vaccine Date Status   COVID-19 MRNA, LNP-S, PF- Pfizer 12/06/2021 Given   COVID-19 MRNA, LNP-S, PF- Pfizer 04/09/2021 Given   COVID-19 MRNA, LNP-S, PF- Pfizer 03/19/2021 Given   influenza virus vaccine, inactivated 09/29/2020 Given   influenza virus vaccine, inactivated 02/20/2020 Given   influenza virus vaccine, inactivated 10/20/2018 Given   tetanus/diphtheria/pertussis, acel(Tdap) 05/17/2018 Given   influenza virus vaccine, inactivated 11/30/2016 Given   poliovirus vaccine, live, trivalent 1981 Recorded   Lab Results  Labs Last 24 Hours?  ?Chemistry? Hematology/Coagulation?   Sodium  Lvl:?128 mmol/L?Low (01/28/22 19:52:00) WBC:?14.7 x10(3)/mcL?High (01/28/22 19:52:00)   Potassium Lvl:?3.3 mmol/L?Low (01/28/22 19:52:00) RBC: 4.66 x10(6)/mcL (01/28/22 19:52:00)   Chloride:?80 mmol/L?Low (01/28/22 19:52:00) Hgb:?11.9 gm/dL?Low (01/28/22 19:52:00)   CO2:?30 mmol/L?High (01/28/22 19:52:00) Hct:?36.9 %?Low (01/28/22 19:52:00)   Calcium Lvl:?10.8 mg/dL?High (01/28/22 19:52:00) Platelet:?519 x10(3)/mcL?High (01/28/22 19:52:00)   Magnesium Lvl: 2.1 mg/dL (01/29/22 01:18:00) MCV:?79.2 fL?Low (01/28/22 19:52:00)   Glucose Lvl:?105 mg/dL?High (01/28/22 19:52:00) MCH:?25.5 pg?Low (01/28/22 19:52:00)   BUN:?68.4 mg/dL?High (01/28/22 19:52:00) MCHC: 32.2 gm/dL (01/28/22 19:52:00)   Creatinine:?6.92 mg/dL?High (01/28/22 19:52:00) RDW:?14.9 %?High (01/28/22 19:52:00)   Est Creat Clearance Ser: 11.42 mL/min (01/28/22 20:24:38) MPV: 9.6 fL (01/28/22 19:52:00)   eGFR-AA:?11?Low (01/28/22 19:52:00) Abs Neut:?12.35 x10(3)/mcL?High (01/28/22 19:52:00)   eGFR-VEL:?9 mL/min/1.73 m2?Low (01/28/22 19:52:00) Neutro Auto: 84 % (01/28/22 19:52:00)   Bili Total: 0.3 mg/dL (01/28/22 19:52:00) Lymph Auto: 10 % (01/28/22 19:52:00)   Bili Direct: 0.2 mg/dL (01/28/22 19:52:00) Mono Auto: 5 % (01/28/22 19:52:00)   Bili Indirect: 0.1 mg/dL (01/28/22 19:52:00) Eos Auto: 0 % (01/28/22 19:52:00)   AST: 16 unit/L (01/28/22 19:52:00) Abs Eos: 0 x10(3)/mcL (01/28/22 19:52:00)   ALT: 21 unit/L (01/28/22 19:52:00) Basophil Auto: 0 % (01/28/22 19:52:00)   Alk Phos: 79 unit/L (01/28/22 19:52:00) Abs Neutro:?12.35 x10(3)/mcL?High (01/28/22 19:52:00)   Total Protein:?9.1 gm/dL?High (01/28/22 19:52:00) Abs Lymph: 1.5 x10(3)/mcL (01/28/22 19:52:00)   Albumin Lvl: 4.1 gm/dL (01/28/22 19:52:00) Abs Mono: 0.7 x10(3)/mcL (01/28/22 19:52:00)   Globulin:?5 gm/dL?High (01/28/22 19:52:00) Abs Baso: 0 x10(3)/mcL (01/28/22 19:52:00)   A/G Ratio:?0.8 ratio?Low (01/28/22 19:52:00) NRBC%: 0.0 (01/28/22 19:52:00)   Phosphorus:?5.2 mg/dL?High (01/29/22  01:18:00) IG%: 0 % (01/28/22 19:52:00)   Iron Lvl:?37 ug/dL?Low (01/29/22 01:15:00) IG#: 0.06 (01/28/22 19:52:00)   Transferrin: 279 mg/dL (01/29/22 01:15:00)    TIBC: 309 ug/dL (01/29/22 01:15:00)    Iron Sat:?12 %?Low (01/29/22 01:15:00)    Ferritin Lvl: 241.31 ng/mL (01/29/22 01:15:00)    Lactic Acid Lvl: 1.9 mmol/L (01/28/22 20:13:00)    UIBC:?272 ug/dL?High (01/29/22 01:15:00)    Troponin-I: <0.010 (01/28/22 19:52:00)    Diagnostic Results  Accession:?[id number]  Order:?CT Head W/O Contrast  Report Dt/Tm:?01/28/2022 22:14  Report:?  History: Syncope.  ?  Exam: Noncontrast CT of the head.. Automatic exposure control was  utilized.  DLP: 954 mGycm  ?  Comparison: None.  ?  FINDINGS: There is normal appearance of the brain parenchyma with  appropriate gray and white matter differentiation, and normal brain  formation. ?Ventricular size and cisternal/sulcal patterns are  appropriate for chronological age. There is no evidence of mass  lesion, intracranial hemorrhage, hydrocephalus, or extra axial fluid  collection. ?The orbital structures are normal. ?The paranasal sinuses  and mastoid air cells are free of disease.  ?  ?  IMPRESSION: No acute intracranial abnormality.  ?  Accession:?[id number]  Order:?XR Shoulder Left Minimum 2 Views  Report Dt/Tm:?01/28/2022 20:41  Report:?  HISTORY: Syncope.  ?  EXAM: XR Shoulder Left Minimum 2 Views.?  ?  PRIOR STUDY: None.  ?  FINDINGS: 3 views of the left shoulder demonstrate normal alignment.  There is no fracture. There is mild osteoarthritis of the glenohumeral  and acromial clavicular joints. There is no soft tissue swelling. The  included left lung is clear.  ?  IMPRESSION: Mild glenohumeral and acromioclavicular osteoarthritis.  ?  Accession:?[id number]  Order:?XR Chest 2 Views  Report Dt/Tm:?01/28/2022 19:51  Report:?  HISTORY: Syncope.  ?  EXAM: XR Chest 2 Views.?  ?  PRIOR STUDY: Chest radiograph 12/27/20  ?  FINDINGS: Radiographic examination of the chest in 2  views  demonstrates clear lungs without consolidation or effusion. There is  no pneumothorax. The cardiac silhouette is normal. There is no acute  osseous abnormality.  ?  IMPRESSION: No acute cardiopulmonary abnormality.  ?  ?

## 2022-09-17 ENCOUNTER — HISTORICAL (OUTPATIENT)
Dept: ADMINISTRATIVE | Facility: HOSPITAL | Age: 42
End: 2022-09-17
Payer: MEDICAID

## 2022-09-22 ENCOUNTER — OFFICE VISIT (OUTPATIENT)
Dept: INTERNAL MEDICINE | Facility: CLINIC | Age: 42
End: 2022-09-22
Payer: MEDICAID

## 2022-09-22 VITALS
HEIGHT: 63 IN | OXYGEN SATURATION: 100 % | RESPIRATION RATE: 18 BRPM | BODY MASS INDEX: 31.01 KG/M2 | HEART RATE: 75 BPM | WEIGHT: 175 LBS | DIASTOLIC BLOOD PRESSURE: 86 MMHG | SYSTOLIC BLOOD PRESSURE: 115 MMHG | TEMPERATURE: 33 F

## 2022-09-22 DIAGNOSIS — K21.9 GASTROESOPHAGEAL REFLUX DISEASE, UNSPECIFIED WHETHER ESOPHAGITIS PRESENT: ICD-10-CM

## 2022-09-22 DIAGNOSIS — E78.5 HYPERLIPIDEMIA, UNSPECIFIED HYPERLIPIDEMIA TYPE: ICD-10-CM

## 2022-09-22 DIAGNOSIS — I10 HYPERTENSION, UNSPECIFIED TYPE: ICD-10-CM

## 2022-09-22 DIAGNOSIS — R73.03 PRE-DIABETES: Primary | ICD-10-CM

## 2022-09-22 DIAGNOSIS — M54.9 BACK PAIN, UNSPECIFIED BACK LOCATION, UNSPECIFIED BACK PAIN LATERALITY, UNSPECIFIED CHRONICITY: ICD-10-CM

## 2022-09-22 DIAGNOSIS — Z13.29 THYROID DISORDER SCREEN: ICD-10-CM

## 2022-09-22 PROCEDURE — 99214 OFFICE O/P EST MOD 30 MIN: CPT | Mod: PBBFAC

## 2022-09-22 RX ORDER — LISINOPRIL 20 MG/1
20 TABLET ORAL
COMMUNITY
Start: 2022-01-30 | End: 2022-09-22 | Stop reason: SDUPTHER

## 2022-09-22 RX ORDER — PANTOPRAZOLE SODIUM 40 MG/1
40 TABLET, DELAYED RELEASE ORAL DAILY
COMMUNITY
End: 2022-09-22 | Stop reason: SDUPTHER

## 2022-09-22 RX ORDER — PANTOPRAZOLE SODIUM 40 MG/1
40 TABLET, DELAYED RELEASE ORAL DAILY
Qty: 30 TABLET | Refills: 5 | Status: SHIPPED | OUTPATIENT
Start: 2022-09-22 | End: 2023-03-06 | Stop reason: SDUPTHER

## 2022-09-22 RX ORDER — LISINOPRIL 20 MG/1
20 TABLET ORAL DAILY
Qty: 30 TABLET | Refills: 5 | Status: SHIPPED | OUTPATIENT
Start: 2022-09-22 | End: 2023-03-06 | Stop reason: SDUPTHER

## 2022-09-22 RX ORDER — METHOCARBAMOL 500 MG/1
500 TABLET, FILM COATED ORAL 2 TIMES DAILY
Qty: 30 TABLET | Refills: 2 | Status: SHIPPED | OUTPATIENT
Start: 2022-09-22 | End: 2022-10-02

## 2022-09-22 NOTE — PROGRESS NOTES
"[facility] INTERNAL MEDICINE  OUTPATIENT OFFICE VISIT NOTE    SUBJECTIVE:      HPI: Huey Pinzon is a 42 y.o. male w/ PMHx of HTN, chronic GERD (since age 21), and back pain who comes into [facility] for a follow. Patient complains of back pain, pointing to the thoracic region for localization. He states this this has been a chronic issue.     Patient has a long standing history with severe GERD. In his last EGD in 2019 showed no Khan's esophagus. Patient states he does not have symptoms of GERD due to the PPI regime he is on.     Smoking hx: Quit since 2019, 10 pack year history      ROS:  (+)  (-) Chest pain, palpitations, SOB, dizziness, syncope, edema, PND, orthopnea, claudication, cough, fever, chills, abdominal pain, vomiting, diarrhea, dysuria, bleeding    OBJECTIVE:     Vital signs:   /86 (BP Location: Left arm, Patient Position: Sitting, BP Method: X-Large (Automatic))   Pulse 75   Temp (!) 33.1 °F (0.6 °C)   Resp 18   Ht 5' 3" (1.6 m)   Wt 79.4 kg (175 lb)   SpO2 100%   BMI 31.00 kg/m²      Physical Examination:  General:  Well-nourished, well-developed, no acute distress  HEENT: Normocephalic, atraumatic. EOMI.  MMM  Neck: Supple. No obvious JVD.  Normal range of motion.  Respiratory: CTAB.  No obvious crackles wheeze or rhonchi.  Cardiovascular:  RRR.  No obvious M/G/R. Warm extremities.  Peripheral pulses intact.  No edema.  Gastrointestinal:  Soft, nontender, nondistended.  Normal bowel sounds.  Neurologic: ANOx3.  Answering questions/following commands appropriately.  No FND      Current Outpatient Medications:     lisinopriL (PRINIVIL,ZESTRIL) 20 MG tablet, Take 20 mg by mouth., Disp: , Rfl:     pantoprazole (PROTONIX) 40 MG tablet, Take 40 mg by mouth once daily. For reflux, Disp: , Rfl:      ASSESSMENT & PLAN:     HTN  - /86 today  - Continue Lisinopril 20 mg once daily    GERD  - Chronic GERD that began at the age of 21  - Continue Protonix 40 mg once day  - Last 2 EGDs (last one " "2019) negative for Khan's Esophagus    Back Pain  - Localized to thoracic region  - Started Robaxin 500 mg BID for 10 days for muscle spasms  - Ordered of XR thoracic and lumbar spine    Obesity  - Was 235 in November 2021, now is 175 lbs now; weight loss from poor lifestyle "not taking care of himself"  - Continues to improve lifestyle, portion control and timing of meals    Ordered a1C, lipid and TSH      Josué Jimenez MD  [facility], [facility]          "

## 2022-09-23 ENCOUNTER — LAB VISIT (OUTPATIENT)
Dept: LAB | Facility: HOSPITAL | Age: 42
End: 2022-09-23
Payer: MEDICAID

## 2022-09-23 DIAGNOSIS — R73.03 PRE-DIABETES: ICD-10-CM

## 2022-09-23 DIAGNOSIS — Z13.29 THYROID DISORDER SCREEN: ICD-10-CM

## 2022-09-23 DIAGNOSIS — E78.5 HYPERLIPIDEMIA, UNSPECIFIED HYPERLIPIDEMIA TYPE: ICD-10-CM

## 2022-09-23 LAB
CHOLEST SERPL-MCNC: 187 MG/DL
CHOLEST/HDLC SERPL: 4 {RATIO} (ref 0–5)
EST. AVERAGE GLUCOSE BLD GHB EST-MCNC: 99.7 MG/DL
HBA1C MFR BLD: 5.1 %
HDLC SERPL-MCNC: 49 MG/DL (ref 35–60)
LDLC SERPL CALC-MCNC: 106 MG/DL (ref 50–140)
TRIGL SERPL-MCNC: 160 MG/DL (ref 34–140)
TSH SERPL-ACNC: 1.07 UIU/ML (ref 0.35–4.94)
VLDLC SERPL CALC-MCNC: 32 MG/DL

## 2022-09-23 PROCEDURE — 36415 COLL VENOUS BLD VENIPUNCTURE: CPT

## 2022-09-23 PROCEDURE — 80061 LIPID PANEL: CPT

## 2022-09-23 PROCEDURE — 84443 ASSAY THYROID STIM HORMONE: CPT

## 2022-09-23 PROCEDURE — 83036 HEMOGLOBIN GLYCOSYLATED A1C: CPT

## 2022-09-28 NOTE — PROGRESS NOTES
Attending Addendum:   Patient seen and examined in clinic. Management and Plan were discussed with resident. Care was reasonable and necessary.   Nina Modi MD  [facility]

## 2022-11-12 ENCOUNTER — OFFICE VISIT (OUTPATIENT)
Dept: URGENT CARE | Facility: CLINIC | Age: 42
End: 2022-11-12
Payer: MEDICAID

## 2022-11-12 VITALS
HEART RATE: 73 BPM | SYSTOLIC BLOOD PRESSURE: 150 MMHG | OXYGEN SATURATION: 98 % | BODY MASS INDEX: 30.01 KG/M2 | TEMPERATURE: 99 F | DIASTOLIC BLOOD PRESSURE: 98 MMHG | RESPIRATION RATE: 18 BRPM | WEIGHT: 169.38 LBS | HEIGHT: 63 IN

## 2022-11-12 DIAGNOSIS — Z11.52 ENCOUNTER FOR SCREENING FOR COVID-19: ICD-10-CM

## 2022-11-12 DIAGNOSIS — J06.9 ACUTE URI: Primary | ICD-10-CM

## 2022-11-12 DIAGNOSIS — R05.9 COUGH, UNSPECIFIED TYPE: ICD-10-CM

## 2022-11-12 DIAGNOSIS — R68.89 FLU-LIKE SYMPTOMS: ICD-10-CM

## 2022-11-12 LAB
CTP QC/QA: YES
CTP QC/QA: YES
FLUAV AG NPH QL: NEGATIVE
FLUBV AG NPH QL: NEGATIVE
SARS-COV-2 RDRP RESP QL NAA+PROBE: NEGATIVE

## 2022-11-12 PROCEDURE — 99213 PR OFFICE/OUTPT VISIT, EST, LEVL III, 20-29 MIN: ICD-10-PCS | Mod: S$PBB,,, | Performed by: NURSE PRACTITIONER

## 2022-11-12 PROCEDURE — 99213 OFFICE O/P EST LOW 20 MIN: CPT | Mod: S$PBB,,, | Performed by: NURSE PRACTITIONER

## 2022-11-12 PROCEDURE — 87635 SARS-COV-2 COVID-19 AMP PRB: CPT | Mod: PBBFAC | Performed by: NURSE PRACTITIONER

## 2022-11-12 PROCEDURE — 99213 OFFICE O/P EST LOW 20 MIN: CPT | Mod: PBBFAC | Performed by: NURSE PRACTITIONER

## 2022-11-12 PROCEDURE — 87804 INFLUENZA ASSAY W/OPTIC: CPT | Mod: PBBFAC | Performed by: NURSE PRACTITIONER

## 2022-11-12 RX ORDER — PROMETHAZINE HYDROCHLORIDE AND DEXTROMETHORPHAN HYDROBROMIDE 6.25; 15 MG/5ML; MG/5ML
5 SYRUP ORAL EVERY 6 HOURS PRN
Qty: 100 ML | Refills: 0 | Status: SHIPPED | OUTPATIENT
Start: 2022-11-12 | End: 2022-11-19

## 2022-11-12 RX ORDER — LEVOCETIRIZINE DIHYDROCHLORIDE 5 MG/1
5 TABLET, FILM COATED ORAL NIGHTLY
Qty: 14 TABLET | Refills: 0 | Status: SHIPPED | OUTPATIENT
Start: 2022-11-12 | End: 2022-11-26

## 2022-11-12 NOTE — PROGRESS NOTES
"Subjective:       Patient ID: Huey Pinzon is a 42 y.o. male.    Vitals:  height is 5' 2.99" (1.6 m) and weight is 76.8 kg (169 lb 6.4 oz). His oral temperature is 98.6 °F (37 °C). His blood pressure is 150/98 (abnormal) and his pulse is 73. His respiration is 18 and oxygen saturation is 98%.     Chief Complaint: Nasal Congestion, Cough, and Generalized Body Aches    Pt is a 43 yo male, here today for congestion, cough, body aches x 1 days. Not taking any medication for symptoms.       Constitution: Positive for chills.   HENT:  Positive for congestion.    Neck: neck negative.   Cardiovascular: Negative.    Respiratory:  Positive for cough.      Objective:      Physical Exam   Constitutional: He is oriented to person, place, and time. He appears well-developed.   HENT:   Head: Normocephalic.   Ears:   Right Ear: Tympanic membrane normal.   Left Ear: Tympanic membrane normal.   Nose: Congestion present.   Mouth/Throat: Oropharynx is clear.   Eyes: Conjunctivae and EOM are normal. Pupils are equal, round, and reactive to light.   Neck: Neck supple.   Cardiovascular: Normal rate, regular rhythm and normal heart sounds.   Pulmonary/Chest: Effort normal and breath sounds normal.   Musculoskeletal: Normal range of motion.         General: Normal range of motion.   Neurological: He is alert and oriented to person, place, and time.   Skin: Skin is warm and dry.   Psychiatric: His behavior is normal.   Vitals reviewed.      Assessment:       1. Acute URI    2. Encounter for screening for COVID-19    3. Flu-like symptoms    4. Cough, unspecified type              Office Visit on 11/12/2022   Component Date Value Ref Range Status    POC Rapid COVID 11/12/2022 Negative  Negative Final     Acceptable 11/12/2022 Yes   Final    Rapid Influenza A Ag 11/12/2022 Negative  Negative Final    Rapid Influenza B Ag 11/12/2022 Negative  Negative Final     Acceptable 11/12/2022 Yes   Final        No results " found.   Plan:         Medication as ordered. May use humidifier.  If any shortness of breath, wheezing, continued fevers or any new symptoms then immediately go to ER.      Acute URI  -     levocetirizine (XYZAL) 5 MG tablet; Take 1 tablet (5 mg total) by mouth every evening. for 14 days  Dispense: 14 tablet; Refill: 0  -     promethazine-dextromethorphan (PROMETHAZINE-DM) 6.25-15 mg/5 mL Syrp; Take 5 mLs by mouth every 6 (six) hours as needed (cough).  Dispense: 100 mL; Refill: 0    Encounter for screening for COVID-19  -     POCT COVID-19 Rapid Screening    Flu-like symptoms  -     POCT Influenza A/B    Cough, unspecified type  -     promethazine-dextromethorphan (PROMETHAZINE-DM) 6.25-15 mg/5 mL Syrp; Take 5 mLs by mouth every 6 (six) hours as needed (cough).  Dispense: 100 mL; Refill: 0

## 2023-03-06 ENCOUNTER — LAB VISIT (OUTPATIENT)
Dept: LAB | Facility: HOSPITAL | Age: 43
End: 2023-03-06
Payer: MEDICAID

## 2023-03-06 ENCOUNTER — OFFICE VISIT (OUTPATIENT)
Dept: INTERNAL MEDICINE | Facility: CLINIC | Age: 43
End: 2023-03-06
Payer: MEDICAID

## 2023-03-06 VITALS
SYSTOLIC BLOOD PRESSURE: 128 MMHG | WEIGHT: 168.63 LBS | RESPIRATION RATE: 18 BRPM | HEIGHT: 63 IN | BODY MASS INDEX: 29.88 KG/M2 | TEMPERATURE: 98 F | DIASTOLIC BLOOD PRESSURE: 82 MMHG | HEART RATE: 80 BPM

## 2023-03-06 DIAGNOSIS — M62.838 NIGHT MUSCLE SPASMS: ICD-10-CM

## 2023-03-06 DIAGNOSIS — K21.9 GASTROESOPHAGEAL REFLUX DISEASE, UNSPECIFIED WHETHER ESOPHAGITIS PRESENT: ICD-10-CM

## 2023-03-06 DIAGNOSIS — M77.10 LATERAL EPICONDYLITIS, UNSPECIFIED LATERALITY: ICD-10-CM

## 2023-03-06 DIAGNOSIS — I10 HYPERTENSION, UNSPECIFIED TYPE: Primary | ICD-10-CM

## 2023-03-06 DIAGNOSIS — I65.23 BILATERAL CAROTID ARTERY STENOSIS: ICD-10-CM

## 2023-03-06 LAB
CRP SERPL-MCNC: 4.5 MG/L
ERYTHROCYTE [SEDIMENTATION RATE] IN BLOOD: 12 MM/HR (ref 0–15)

## 2023-03-06 PROCEDURE — 36415 COLL VENOUS BLD VENIPUNCTURE: CPT

## 2023-03-06 PROCEDURE — 86140 C-REACTIVE PROTEIN: CPT

## 2023-03-06 PROCEDURE — 85651 RBC SED RATE NONAUTOMATED: CPT

## 2023-03-06 PROCEDURE — 99215 OFFICE O/P EST HI 40 MIN: CPT | Mod: PBBFAC

## 2023-03-06 RX ORDER — LISINOPRIL 20 MG/1
20 TABLET ORAL DAILY
Qty: 90 TABLET | Refills: 3 | Status: SHIPPED | OUTPATIENT
Start: 2023-03-06

## 2023-03-06 RX ORDER — METHOCARBAMOL 500 MG/1
500 TABLET, FILM COATED ORAL 4 TIMES DAILY
Qty: 45 TABLET | Refills: 5 | Status: SHIPPED | OUTPATIENT
Start: 2023-03-06 | End: 2023-03-16

## 2023-03-06 RX ORDER — LISINOPRIL 20 MG/1
20 TABLET ORAL DAILY
Qty: 30 TABLET | Refills: 5 | Status: SHIPPED | OUTPATIENT
Start: 2023-03-06 | End: 2023-03-06

## 2023-03-06 RX ORDER — TIZANIDINE 4 MG/1
8 TABLET ORAL NIGHTLY
COMMUNITY
Start: 2022-02-07

## 2023-03-06 RX ORDER — PANTOPRAZOLE SODIUM 40 MG/1
40 TABLET, DELAYED RELEASE ORAL DAILY
Qty: 30 TABLET | Refills: 5 | Status: SHIPPED | OUTPATIENT
Start: 2023-03-06 | End: 2023-03-06

## 2023-03-06 RX ORDER — PANTOPRAZOLE SODIUM 40 MG/1
40 TABLET, DELAYED RELEASE ORAL DAILY
Qty: 90 TABLET | Refills: 3 | Status: SHIPPED | OUTPATIENT
Start: 2023-03-06

## 2023-03-14 DIAGNOSIS — R20.2 PARESTHESIA AND PAIN OF BOTH UPPER EXTREMITIES: Primary | ICD-10-CM

## 2023-03-14 DIAGNOSIS — M79.602 PARESTHESIA AND PAIN OF BOTH UPPER EXTREMITIES: Primary | ICD-10-CM

## 2023-03-14 DIAGNOSIS — M79.601 PARESTHESIA AND PAIN OF BOTH UPPER EXTREMITIES: Primary | ICD-10-CM

## 2023-05-16 ENCOUNTER — HOSPITAL ENCOUNTER (OUTPATIENT)
Dept: RADIOLOGY | Facility: HOSPITAL | Age: 43
Discharge: HOME | End: 2023-05-16
Payer: MEDICAID

## 2023-05-16 DIAGNOSIS — I65.23 BILATERAL CAROTID ARTERY STENOSIS: ICD-10-CM

## 2023-05-16 PROCEDURE — 93880 EXTRACRANIAL BILAT STUDY: CPT

## 2023-05-20 LAB
LEFT CCA DIST DIAS: 25 CM/S
LEFT CCA DIST SYS: 97 CM/S
LEFT CCA PROX DIAS: 22 CM/S
LEFT CCA PROX SYS: 101 CM/S
LEFT ECA DIAS: 14 CM/S
LEFT ECA SYS: 111 CM/S
LEFT ICA DIST DIAS: 25 CM/S
LEFT ICA DIST SYS: 73 CM/S
LEFT ICA MID DIAS: 24 CM/S
LEFT ICA MID SYS: 59 CM/S
LEFT ICA PROX DIAS: 22 CM/S
LEFT ICA PROX SYS: 67 CM/S
LEFT VERTEBRAL DIAS: 10 CM/S
LEFT VERTEBRAL SYS: 38 CM/S
OHS CV CAROTID RIGHT ICA EDV HIGHEST: 31
OHS CV CAROTID ULTRASOUND LEFT ICA/CCA RATIO: 0.75
OHS CV CAROTID ULTRASOUND RIGHT ICA/CCA RATIO: 0.82
OHS CV PV CAROTID LEFT HIGHEST CCA: 101
OHS CV PV CAROTID LEFT HIGHEST ICA: 73
OHS CV PV CAROTID RIGHT HIGHEST CCA: 113
OHS CV PV CAROTID RIGHT HIGHEST ICA: 84
OHS CV US CAROTID LEFT HIGHEST EDV: 25
RIGHT CCA DIST DIAS: 24 CM/S
RIGHT CCA DIST SYS: 103 CM/S
RIGHT CCA PROX DIAS: 19 CM/S
RIGHT CCA PROX SYS: 113 CM/S
RIGHT ECA DIAS: 12 CM/S
RIGHT ECA SYS: 124 CM/S
RIGHT ICA DIST DIAS: 31 CM/S
RIGHT ICA DIST SYS: 84 CM/S
RIGHT ICA MID DIAS: 29 CM/S
RIGHT ICA MID SYS: 82 CM/S
RIGHT ICA PROX DIAS: 14 CM/S
RIGHT ICA PROX SYS: 59 CM/S
RIGHT VERTEBRAL DIAS: 26 CM/S
RIGHT VERTEBRAL SYS: 62 CM/S